# Patient Record
Sex: FEMALE | Race: ASIAN | NOT HISPANIC OR LATINO
[De-identification: names, ages, dates, MRNs, and addresses within clinical notes are randomized per-mention and may not be internally consistent; named-entity substitution may affect disease eponyms.]

---

## 2020-01-06 PROBLEM — Z00.00 ENCOUNTER FOR PREVENTIVE HEALTH EXAMINATION: Status: ACTIVE | Noted: 2020-01-06

## 2020-01-21 ENCOUNTER — APPOINTMENT (OUTPATIENT)
Dept: PULMONOLOGY | Facility: CLINIC | Age: 25
End: 2020-01-21

## 2020-03-03 ENCOUNTER — APPOINTMENT (OUTPATIENT)
Dept: ANTEPARTUM | Facility: CLINIC | Age: 25
End: 2020-03-03
Payer: COMMERCIAL

## 2020-03-03 ENCOUNTER — ASOB RESULT (OUTPATIENT)
Age: 25
End: 2020-03-03

## 2020-03-03 PROCEDURE — 36416 COLLJ CAPILLARY BLOOD SPEC: CPT

## 2020-03-03 PROCEDURE — 76801 OB US < 14 WKS SINGLE FETUS: CPT

## 2020-03-03 PROCEDURE — 76813 OB US NUCHAL MEAS 1 GEST: CPT

## 2020-04-23 ENCOUNTER — ASOB RESULT (OUTPATIENT)
Age: 25
End: 2020-04-23

## 2020-04-23 ENCOUNTER — APPOINTMENT (OUTPATIENT)
Dept: ANTEPARTUM | Facility: CLINIC | Age: 25
End: 2020-04-23
Payer: COMMERCIAL

## 2020-04-23 PROCEDURE — 76811 OB US DETAILED SNGL FETUS: CPT

## 2020-06-21 ENCOUNTER — OUTPATIENT (OUTPATIENT)
Dept: OUTPATIENT SERVICES | Facility: HOSPITAL | Age: 25
LOS: 1 days | End: 2020-06-21
Payer: COMMERCIAL

## 2020-06-21 DIAGNOSIS — O26.899 OTHER SPECIFIED PREGNANCY RELATED CONDITIONS, UNSPECIFIED TRIMESTER: ICD-10-CM

## 2020-06-21 DIAGNOSIS — Z3A.00 WEEKS OF GESTATION OF PREGNANCY NOT SPECIFIED: ICD-10-CM

## 2020-06-21 PROCEDURE — G0463: CPT

## 2020-06-21 PROCEDURE — 59025 FETAL NON-STRESS TEST: CPT

## 2020-07-14 ENCOUNTER — APPOINTMENT (OUTPATIENT)
Dept: ANTEPARTUM | Facility: CLINIC | Age: 25
End: 2020-07-14
Payer: COMMERCIAL

## 2020-07-14 ENCOUNTER — ASOB RESULT (OUTPATIENT)
Age: 25
End: 2020-07-14

## 2020-07-14 PROCEDURE — 76816 OB US FOLLOW-UP PER FETUS: CPT

## 2020-09-08 ENCOUNTER — APPOINTMENT (OUTPATIENT)
Dept: ANTEPARTUM | Facility: CLINIC | Age: 25
End: 2020-09-08

## 2020-09-08 ENCOUNTER — ASOB RESULT (OUTPATIENT)
Age: 25
End: 2020-09-08

## 2020-09-08 ENCOUNTER — APPOINTMENT (OUTPATIENT)
Dept: ANTEPARTUM | Facility: CLINIC | Age: 25
End: 2020-09-08
Payer: COMMERCIAL

## 2020-09-08 PROCEDURE — 76816 OB US FOLLOW-UP PER FETUS: CPT

## 2020-09-08 PROCEDURE — 76820 UMBILICAL ARTERY ECHO: CPT

## 2020-09-08 PROCEDURE — 76818 FETAL BIOPHYS PROFILE W/NST: CPT

## 2020-09-10 ENCOUNTER — APPOINTMENT (OUTPATIENT)
Dept: ANTEPARTUM | Facility: CLINIC | Age: 25
End: 2020-09-10

## 2020-09-10 ENCOUNTER — INPATIENT (INPATIENT)
Facility: HOSPITAL | Age: 25
LOS: 2 days | Discharge: HOME | End: 2020-09-13
Attending: OBSTETRICS & GYNECOLOGY | Admitting: OBSTETRICS & GYNECOLOGY
Payer: COMMERCIAL

## 2020-09-10 VITALS — HEIGHT: 64 IN | WEIGHT: 149.91 LBS

## 2020-09-10 DIAGNOSIS — O26.899 OTHER SPECIFIED PREGNANCY RELATED CONDITIONS, UNSPECIFIED TRIMESTER: ICD-10-CM

## 2020-09-10 DIAGNOSIS — Z3A.00 WEEKS OF GESTATION OF PREGNANCY NOT SPECIFIED: ICD-10-CM

## 2020-09-10 DIAGNOSIS — Z34.80 ENCOUNTER FOR SUPERVISION OF OTHER NORMAL PREGNANCY, UNSPECIFIED TRIMESTER: ICD-10-CM

## 2020-09-10 LAB
BASOPHILS # BLD AUTO: 0.03 K/UL — SIGNIFICANT CHANGE UP (ref 0–0.2)
BASOPHILS NFR BLD AUTO: 0.3 % — SIGNIFICANT CHANGE UP (ref 0–2)
DAT POLY-SP REAG RBC QL: NEGATIVE — SIGNIFICANT CHANGE UP
EOSINOPHIL # BLD AUTO: 0.07 K/UL — SIGNIFICANT CHANGE UP (ref 0–0.5)
EOSINOPHIL NFR BLD AUTO: 0.6 % — SIGNIFICANT CHANGE UP (ref 0–6)
HCT VFR BLD CALC: 36.5 % — SIGNIFICANT CHANGE UP (ref 34.5–45)
HGB BLD-MCNC: 12.3 G/DL — SIGNIFICANT CHANGE UP (ref 11.5–15.5)
IMM GRANULOCYTES NFR BLD AUTO: 0.7 % — SIGNIFICANT CHANGE UP (ref 0–1.5)
LYMPHOCYTES # BLD AUTO: 18 % — SIGNIFICANT CHANGE UP (ref 13–44)
LYMPHOCYTES # BLD AUTO: 2.05 K/UL — SIGNIFICANT CHANGE UP (ref 1–3.3)
MCHC RBC-ENTMCNC: 31.8 PG — SIGNIFICANT CHANGE UP (ref 27–34)
MCHC RBC-ENTMCNC: 33.7 GM/DL — SIGNIFICANT CHANGE UP (ref 32–36)
MCV RBC AUTO: 94.3 FL — SIGNIFICANT CHANGE UP (ref 80–100)
MONOCYTES # BLD AUTO: 0.78 K/UL — SIGNIFICANT CHANGE UP (ref 0–0.9)
MONOCYTES NFR BLD AUTO: 6.9 % — SIGNIFICANT CHANGE UP (ref 2–14)
NEUTROPHILS # BLD AUTO: 8.35 K/UL — HIGH (ref 1.8–7.4)
NEUTROPHILS NFR BLD AUTO: 73.5 % — SIGNIFICANT CHANGE UP (ref 43–77)
NRBC # BLD: 0 /100 WBCS — SIGNIFICANT CHANGE UP (ref 0–0)
PLATELET # BLD AUTO: 180 K/UL — SIGNIFICANT CHANGE UP (ref 150–400)
RBC # BLD: 3.87 M/UL — SIGNIFICANT CHANGE UP (ref 3.8–5.2)
RBC # FLD: 12.1 % — SIGNIFICANT CHANGE UP (ref 10.3–14.5)
RH IG SCN BLD-IMP: POSITIVE — SIGNIFICANT CHANGE UP
SARS-COV-2 RNA SPEC QL NAA+PROBE: SIGNIFICANT CHANGE UP
WBC # BLD: 11.36 K/UL — HIGH (ref 3.8–10.5)
WBC # FLD AUTO: 11.36 K/UL — HIGH (ref 3.8–10.5)

## 2020-09-10 RX ORDER — OXYTOCIN 10 UNIT/ML
333.33 VIAL (ML) INJECTION
Qty: 20 | Refills: 0 | Status: DISCONTINUED | OUTPATIENT
Start: 2020-09-10 | End: 2020-09-13

## 2020-09-10 RX ORDER — SODIUM CHLORIDE 9 MG/ML
1000 INJECTION, SOLUTION INTRAVENOUS
Refills: 0 | Status: DISCONTINUED | OUTPATIENT
Start: 2020-09-10 | End: 2020-09-11

## 2020-09-10 RX ORDER — CITRIC ACID/SODIUM CITRATE 300-500 MG
15 SOLUTION, ORAL ORAL EVERY 6 HOURS
Refills: 0 | Status: DISCONTINUED | OUTPATIENT
Start: 2020-09-10 | End: 2020-09-11

## 2020-09-11 LAB
BLD GP AB SCN SERPL QL: NEGATIVE — SIGNIFICANT CHANGE UP
SARS-COV-2 IGG SERPL QL IA: NEGATIVE — SIGNIFICANT CHANGE UP
SARS-COV-2 IGM SERPL IA-ACNC: <0.1 INDEX — SIGNIFICANT CHANGE UP
T PALLIDUM AB TITR SER: NEGATIVE — SIGNIFICANT CHANGE UP

## 2020-09-11 RX ORDER — SODIUM CHLORIDE 9 MG/ML
1000 INJECTION, SOLUTION INTRAVENOUS
Refills: 0 | Status: DISCONTINUED | OUTPATIENT
Start: 2020-09-11 | End: 2020-09-13

## 2020-09-11 RX ORDER — MAGNESIUM HYDROXIDE 400 MG/1
30 TABLET, CHEWABLE ORAL
Refills: 0 | Status: DISCONTINUED | OUTPATIENT
Start: 2020-09-11 | End: 2020-09-13

## 2020-09-11 RX ORDER — DIPHENHYDRAMINE HCL 50 MG
25 CAPSULE ORAL EVERY 6 HOURS
Refills: 0 | Status: DISCONTINUED | OUTPATIENT
Start: 2020-09-11 | End: 2020-09-13

## 2020-09-11 RX ORDER — NALOXONE HYDROCHLORIDE 4 MG/.1ML
0.1 SPRAY NASAL
Refills: 0 | Status: DISCONTINUED | OUTPATIENT
Start: 2020-09-11 | End: 2020-09-12

## 2020-09-11 RX ORDER — LANOLIN
1 OINTMENT (GRAM) TOPICAL EVERY 6 HOURS
Refills: 0 | Status: DISCONTINUED | OUTPATIENT
Start: 2020-09-11 | End: 2020-09-13

## 2020-09-11 RX ORDER — OXYTOCIN 10 UNIT/ML
333.33 VIAL (ML) INJECTION
Qty: 20 | Refills: 0 | Status: DISCONTINUED | OUTPATIENT
Start: 2020-09-11 | End: 2020-09-13

## 2020-09-11 RX ORDER — TETANUS TOXOID, REDUCED DIPHTHERIA TOXOID AND ACELLULAR PERTUSSIS VACCINE, ADSORBED 5; 2.5; 8; 8; 2.5 [IU]/.5ML; [IU]/.5ML; UG/.5ML; UG/.5ML; UG/.5ML
0.5 SUSPENSION INTRAMUSCULAR ONCE
Refills: 0 | Status: DISCONTINUED | OUTPATIENT
Start: 2020-09-11 | End: 2020-09-13

## 2020-09-11 RX ORDER — ACETAMINOPHEN 500 MG
975 TABLET ORAL
Refills: 0 | Status: DISCONTINUED | OUTPATIENT
Start: 2020-09-11 | End: 2020-09-13

## 2020-09-11 RX ORDER — ONDANSETRON 8 MG/1
4 TABLET, FILM COATED ORAL EVERY 6 HOURS
Refills: 0 | Status: DISCONTINUED | OUTPATIENT
Start: 2020-09-11 | End: 2020-09-12

## 2020-09-11 RX ORDER — DEXAMETHASONE 0.5 MG/5ML
4 ELIXIR ORAL EVERY 6 HOURS
Refills: 0 | Status: DISCONTINUED | OUTPATIENT
Start: 2020-09-11 | End: 2020-09-12

## 2020-09-11 RX ORDER — OXYCODONE HYDROCHLORIDE 5 MG/1
5 TABLET ORAL ONCE
Refills: 0 | Status: DISCONTINUED | OUTPATIENT
Start: 2020-09-11 | End: 2020-09-13

## 2020-09-11 RX ORDER — SENNA PLUS 8.6 MG/1
1 TABLET ORAL ONCE
Refills: 0 | Status: COMPLETED | OUTPATIENT
Start: 2020-09-11 | End: 2020-09-11

## 2020-09-11 RX ORDER — OXYCODONE HYDROCHLORIDE 5 MG/1
5 TABLET ORAL
Refills: 0 | Status: DISCONTINUED | OUTPATIENT
Start: 2020-09-11 | End: 2020-09-12

## 2020-09-11 RX ORDER — KETOROLAC TROMETHAMINE 30 MG/ML
30 SYRINGE (ML) INJECTION EVERY 6 HOURS
Refills: 0 | Status: DISCONTINUED | OUTPATIENT
Start: 2020-09-11 | End: 2020-09-11

## 2020-09-11 RX ORDER — OXYCODONE HYDROCHLORIDE 5 MG/1
5 TABLET ORAL
Refills: 0 | Status: COMPLETED | OUTPATIENT
Start: 2020-09-11 | End: 2020-09-18

## 2020-09-11 RX ORDER — OXYCODONE HYDROCHLORIDE 5 MG/1
10 TABLET ORAL
Refills: 0 | Status: DISCONTINUED | OUTPATIENT
Start: 2020-09-11 | End: 2020-09-12

## 2020-09-11 RX ORDER — SIMETHICONE 80 MG/1
80 TABLET, CHEWABLE ORAL EVERY 4 HOURS
Refills: 0 | Status: DISCONTINUED | OUTPATIENT
Start: 2020-09-11 | End: 2020-09-13

## 2020-09-11 RX ORDER — MORPHINE SULFATE 50 MG/1
0.1 CAPSULE, EXTENDED RELEASE ORAL ONCE
Refills: 0 | Status: DISCONTINUED | OUTPATIENT
Start: 2020-09-11 | End: 2020-09-12

## 2020-09-11 RX ORDER — HEPARIN SODIUM 5000 [USP'U]/ML
5000 INJECTION INTRAVENOUS; SUBCUTANEOUS EVERY 12 HOURS
Refills: 0 | Status: DISCONTINUED | OUTPATIENT
Start: 2020-09-11 | End: 2020-09-13

## 2020-09-11 RX ORDER — CEFAZOLIN SODIUM 1 G
1000 VIAL (EA) INJECTION EVERY 8 HOURS
Refills: 0 | Status: DISCONTINUED | OUTPATIENT
Start: 2020-09-11 | End: 2020-09-13

## 2020-09-11 RX ORDER — IBUPROFEN 200 MG
600 TABLET ORAL EVERY 6 HOURS
Refills: 0 | Status: COMPLETED | OUTPATIENT
Start: 2020-09-11 | End: 2021-08-10

## 2020-09-11 RX ADMIN — Medication 975 MILLIGRAM(S): at 19:50

## 2020-09-11 RX ADMIN — Medication 30 MILLIGRAM(S): at 08:21

## 2020-09-11 RX ADMIN — Medication 975 MILLIGRAM(S): at 13:20

## 2020-09-11 RX ADMIN — Medication 975 MILLIGRAM(S): at 23:35

## 2020-09-11 RX ADMIN — Medication 30 MILLIGRAM(S): at 15:20

## 2020-09-11 RX ADMIN — Medication 30 MILLIGRAM(S): at 15:50

## 2020-09-11 RX ADMIN — Medication 100 MILLIGRAM(S): at 23:00

## 2020-09-11 RX ADMIN — HEPARIN SODIUM 5000 UNIT(S): 5000 INJECTION INTRAVENOUS; SUBCUTANEOUS at 19:50

## 2020-09-11 RX ADMIN — Medication 975 MILLIGRAM(S): at 23:00

## 2020-09-11 RX ADMIN — Medication 975 MILLIGRAM(S): at 04:40

## 2020-09-11 RX ADMIN — Medication 100 MILLIGRAM(S): at 15:16

## 2020-09-11 RX ADMIN — Medication 975 MILLIGRAM(S): at 13:50

## 2020-09-11 RX ADMIN — Medication 30 MILLIGRAM(S): at 21:18

## 2020-09-11 RX ADMIN — HEPARIN SODIUM 5000 UNIT(S): 5000 INJECTION INTRAVENOUS; SUBCUTANEOUS at 08:21

## 2020-09-11 RX ADMIN — Medication 30 MILLIGRAM(S): at 08:50

## 2020-09-11 RX ADMIN — Medication 100 MILLIGRAM(S): at 08:19

## 2020-09-11 RX ADMIN — Medication 1000 MILLIUNIT(S)/MIN: at 01:44

## 2020-09-11 RX ADMIN — Medication 975 MILLIGRAM(S): at 20:20

## 2020-09-11 RX ADMIN — SENNA PLUS 1 TABLET(S): 8.6 TABLET ORAL at 21:26

## 2020-09-11 NOTE — PROGRESS NOTE ADULT - SUBJECTIVE AND OBJECTIVE BOX
Day 0 of Anesthesia Pain Management Service    SUBJECTIVE:  Pain Scale Score:          [X] Refer to charted pain scores    THERAPY:    s/p    100 mcg PF morphine on 9/11/2020      MEDICATIONS  (STANDING):  acetaminophen   Tablet .. 975 milliGRAM(s) Oral <User Schedule>  ceFAZolin   IVPB 1000 milliGRAM(s) IV Intermittent every 8 hours  diphtheria/tetanus/pertussis (acellular) Vaccine (ADAcel) 0.5 milliLiter(s) IntraMuscular once  heparin   Injectable 5000 Unit(s) SubCutaneous every 12 hours  ibuprofen  Tablet. 600 milliGRAM(s) Oral every 6 hours  ketorolac   Injectable 30 milliGRAM(s) IV Push every 6 hours  lactated ringers. 1000 milliLiter(s) (125 mL/Hr) IV Continuous <Continuous>  morphine PF Spinal 0.1 milliGRAM(s) IntraThecal. once  oxytocin Infusion 333.333 milliUNIT(s)/Min (1000 mL/Hr) IV Continuous <Continuous>  oxytocin Infusion 333.333 milliUNIT(s)/Min (1000 mL/Hr) IV Continuous <Continuous>    MEDICATIONS  (PRN):  dexAMETHasone  Injectable 4 milliGRAM(s) IV Push every 6 hours PRN Nausea  diphenhydrAMINE 25 milliGRAM(s) Oral every 6 hours PRN Itching  lanolin Ointment 1 Application(s) Topical every 6 hours PRN Sore Nipples  magnesium hydroxide Suspension 30 milliLiter(s) Oral two times a day PRN Constipation  naloxone Injectable 0.1 milliGRAM(s) IV Push every 3 minutes PRN For ANY of the following changes in patient status:  A. Breaths Per Minute LESS THAN 10, B. Oxygen saturation LESS THAN 90%, C. Sedation score of 6 for Stop After: 4 Times  ondansetron Injectable 4 milliGRAM(s) IV Push every 6 hours PRN Nausea  oxyCODONE    IR 5 milliGRAM(s) Oral every 3 hours PRN Mild Pain (1 - 3)  oxyCODONE    IR 10 milliGRAM(s) Oral every 3 hours PRN Moderate Pain (4 - 6)  oxyCODONE    IR 5 milliGRAM(s) Oral every 3 hours PRN Moderate to Severe Pain (4-10)  oxyCODONE    IR 5 milliGRAM(s) Oral once PRN Moderate to Severe Pain (4-10)  simethicone 80 milliGRAM(s) Chew every 4 hours PRN Gas      OBJECTIVE:    Sedation:        	[X] Alert	 [ ] Drowsy	[ ] Arousable      [ ] Asleep       [ ] Unresponsive    Side Effects:	[X] None 	[ ] Nausea	[ ] Vomiting         [ ] Pruritus  		[ ] Weakness            [ ] Numbness	          [ ] Other:    Vital Signs Last 24 Hrs  T(C): 36.4 (11 Sep 2020 08:45), Max: 36.6 (11 Sep 2020 01:30)  T(F): 97.5 (11 Sep 2020 08:45), Max: 97.9 (11 Sep 2020 01:30)  HR: 71 (11 Sep 2020 05:26) (66 - 96)  BP: 102/67 (11 Sep 2020 05:26) (99/57 - 146/59)  BP(mean): 90 (11 Sep 2020 03:45) (73 - 96)  RR: 17 (11 Sep 2020 08:45) (17 - 20)  SpO2: 97% (11 Sep 2020 08:45) (97% - 100%)    ASSESSMENT/ PLAN  [X] Patient to be transitioned to prn analgesics tomorrow   [X] Pain management per primary service, pain service to sign off   [X]Documentation and Verification of current medications

## 2020-09-12 LAB
BASOPHILS # BLD AUTO: 0.04 K/UL — SIGNIFICANT CHANGE UP (ref 0–0.2)
BASOPHILS NFR BLD AUTO: 0.3 % — SIGNIFICANT CHANGE UP (ref 0–2)
EOSINOPHIL # BLD AUTO: 0.14 K/UL — SIGNIFICANT CHANGE UP (ref 0–0.5)
EOSINOPHIL NFR BLD AUTO: 1 % — SIGNIFICANT CHANGE UP (ref 0–6)
HCT VFR BLD CALC: 27.9 % — LOW (ref 34.5–45)
HGB BLD-MCNC: 9.2 G/DL — LOW (ref 11.5–15.5)
IMM GRANULOCYTES NFR BLD AUTO: 0.7 % — SIGNIFICANT CHANGE UP (ref 0–1.5)
LYMPHOCYTES # BLD AUTO: 17.2 % — SIGNIFICANT CHANGE UP (ref 13–44)
LYMPHOCYTES # BLD AUTO: 2.46 K/UL — SIGNIFICANT CHANGE UP (ref 1–3.3)
MCHC RBC-ENTMCNC: 32.1 PG — SIGNIFICANT CHANGE UP (ref 27–34)
MCHC RBC-ENTMCNC: 33 GM/DL — SIGNIFICANT CHANGE UP (ref 32–36)
MCV RBC AUTO: 97.2 FL — SIGNIFICANT CHANGE UP (ref 80–100)
MONOCYTES # BLD AUTO: 1.06 K/UL — HIGH (ref 0–0.9)
MONOCYTES NFR BLD AUTO: 7.4 % — SIGNIFICANT CHANGE UP (ref 2–14)
NEUTROPHILS # BLD AUTO: 10.47 K/UL — HIGH (ref 1.8–7.4)
NEUTROPHILS NFR BLD AUTO: 73.4 % — SIGNIFICANT CHANGE UP (ref 43–77)
NRBC # BLD: 0 /100 WBCS — SIGNIFICANT CHANGE UP (ref 0–0)
PLATELET # BLD AUTO: 128 K/UL — LOW (ref 150–400)
RBC # BLD: 2.87 M/UL — LOW (ref 3.8–5.2)
RBC # FLD: 12.6 % — SIGNIFICANT CHANGE UP (ref 10.3–14.5)
WBC # BLD: 14.27 K/UL — HIGH (ref 3.8–10.5)
WBC # FLD AUTO: 14.27 K/UL — HIGH (ref 3.8–10.5)

## 2020-09-12 RX ORDER — ASCORBIC ACID 60 MG
500 TABLET,CHEWABLE ORAL THREE TIMES A DAY
Refills: 0 | Status: DISCONTINUED | OUTPATIENT
Start: 2020-09-12 | End: 2020-09-13

## 2020-09-12 RX ORDER — OXYCODONE HYDROCHLORIDE 5 MG/1
5 TABLET ORAL
Refills: 0 | Status: DISCONTINUED | OUTPATIENT
Start: 2020-09-12 | End: 2020-09-13

## 2020-09-12 RX ORDER — FERROUS SULFATE 325(65) MG
325 TABLET ORAL THREE TIMES A DAY
Refills: 0 | Status: DISCONTINUED | OUTPATIENT
Start: 2020-09-12 | End: 2020-09-13

## 2020-09-12 RX ORDER — SENNA PLUS 8.6 MG/1
1 TABLET ORAL ONCE
Refills: 0 | Status: COMPLETED | OUTPATIENT
Start: 2020-09-12 | End: 2020-09-12

## 2020-09-12 RX ORDER — IBUPROFEN 200 MG
600 TABLET ORAL EVERY 6 HOURS
Refills: 0 | Status: DISCONTINUED | OUTPATIENT
Start: 2020-09-12 | End: 2020-09-13

## 2020-09-12 RX ORDER — OXYCODONE HYDROCHLORIDE 5 MG/1
5 TABLET ORAL ONCE
Refills: 0 | Status: DISCONTINUED | OUTPATIENT
Start: 2020-09-12 | End: 2020-09-12

## 2020-09-12 RX ADMIN — Medication 325 MILLIGRAM(S): at 17:57

## 2020-09-12 RX ADMIN — SIMETHICONE 80 MILLIGRAM(S): 80 TABLET, CHEWABLE ORAL at 17:58

## 2020-09-12 RX ADMIN — Medication 100 MILLIGRAM(S): at 17:58

## 2020-09-12 RX ADMIN — MAGNESIUM HYDROXIDE 30 MILLILITER(S): 400 TABLET, CHEWABLE ORAL at 20:25

## 2020-09-12 RX ADMIN — Medication 600 MILLIGRAM(S): at 13:15

## 2020-09-12 RX ADMIN — Medication 100 MILLIGRAM(S): at 09:46

## 2020-09-12 RX ADMIN — SENNA PLUS 1 TABLET(S): 8.6 TABLET ORAL at 14:30

## 2020-09-12 RX ADMIN — Medication 600 MILLIGRAM(S): at 21:00

## 2020-09-12 RX ADMIN — Medication 975 MILLIGRAM(S): at 23:25

## 2020-09-12 RX ADMIN — Medication 975 MILLIGRAM(S): at 22:51

## 2020-09-12 RX ADMIN — HEPARIN SODIUM 5000 UNIT(S): 5000 INJECTION INTRAVENOUS; SUBCUTANEOUS at 08:00

## 2020-09-12 RX ADMIN — OXYCODONE HYDROCHLORIDE 5 MILLIGRAM(S): 5 TABLET ORAL at 21:00

## 2020-09-12 RX ADMIN — Medication 600 MILLIGRAM(S): at 12:43

## 2020-09-12 RX ADMIN — OXYCODONE HYDROCHLORIDE 5 MILLIGRAM(S): 5 TABLET ORAL at 06:35

## 2020-09-12 RX ADMIN — OXYCODONE HYDROCHLORIDE 5 MILLIGRAM(S): 5 TABLET ORAL at 11:22

## 2020-09-12 RX ADMIN — Medication 975 MILLIGRAM(S): at 18:30

## 2020-09-12 RX ADMIN — OXYCODONE HYDROCHLORIDE 5 MILLIGRAM(S): 5 TABLET ORAL at 06:00

## 2020-09-12 RX ADMIN — Medication 975 MILLIGRAM(S): at 17:57

## 2020-09-12 RX ADMIN — OXYCODONE HYDROCHLORIDE 5 MILLIGRAM(S): 5 TABLET ORAL at 20:25

## 2020-09-12 RX ADMIN — Medication 975 MILLIGRAM(S): at 09:00

## 2020-09-12 RX ADMIN — HEPARIN SODIUM 5000 UNIT(S): 5000 INJECTION INTRAVENOUS; SUBCUTANEOUS at 20:25

## 2020-09-12 RX ADMIN — Medication 975 MILLIGRAM(S): at 09:30

## 2020-09-12 RX ADMIN — Medication 500 MILLIGRAM(S): at 17:57

## 2020-09-12 RX ADMIN — Medication 600 MILLIGRAM(S): at 20:25

## 2020-09-12 RX ADMIN — OXYCODONE HYDROCHLORIDE 5 MILLIGRAM(S): 5 TABLET ORAL at 10:52

## 2020-09-12 NOTE — PROGRESS NOTE ADULT - SUBJECTIVE AND OBJECTIVE BOX
Postpartum Note-  Section POD#1    Allergies  No Known Allergies    Intolerances  Denies    Rubella: Immune                      RPR: Nonreactive                    Blood Type: A+    S:Patient is a  24y   POD#1 S/P  primary C/Sec  Patient w/o complaints, pain is controlled.  Pt is OOB, tolerating PO, passing flatus. Lochia WNL.   Feeding: Breast feeding    O:  Vital Signs Last 24 Hrs  T(C): 36.6 (12 Sep 2020 10:14), Max: 37 (11 Sep 2020 17:30)  T(F): 97.9 (12 Sep 2020 10:14), Max: 98.6 (11 Sep 2020 17:30)  HR: 80 (12 Sep 2020 10:14) (74 - 98)  BP: 95/63 (12 Sep 2020 10:14) (90/56 - 95/66)  BP(mean): --  RR: 18 (12 Sep 2020 10:14) (17 - 18)  SpO2: 99% (12 Sep 2020 10:14) (98% - 99%)  I&O's Summary    11 Sep 2020 07:01  -  12 Sep 2020 07:00  --------------------------------------------------------  IN: 0 mL / OUT: 2100 mL / NET: -2100 mL        Gen: NAD  CV: rrr s1s2, CTABL  Abdomen: Soft, nontender, non-distended, fundus firm.  Bowel sounds x 4 quadrants  Incision: Clean, dry, and intact.  Negative erythema/edema/ecchymosis   Sub Q  Lochia WNL  Ext: PAS in place. Negative Homans B/L.  Pedal pulses palpated B/L    LABS:                          9.2    14.27 )-----------( 128      ( 12 Sep 2020 06:42 )             27.9       A/P:  24y  POD # 1 S/P primary   section, doing well    PMHx: Denies  Current Issues: Denies    Increase OOB  Renew IVF  Renew PCEA  DVT ppx  Regular diet  AM CBC  Routine Postpartum/Post-op care    Keiko Mckeon PA-C

## 2020-09-12 NOTE — CHART NOTE - NSCHARTNOTEFT_GEN_A_CORE
DONNIE ESPOSITO     POD#1    Vital Signs Last 24 Hrs  T(C): 36.7 (12 Sep 2020 13:46), Max: 37 (11 Sep 2020 17:30)  T(F): 98.1 (12 Sep 2020 13:46), Max: 98.6 (11 Sep 2020 17:30)  HR: 93 (12 Sep 2020 13:46) (80 - 98)  BP: 109/74 (12 Sep 2020 13:46) (90/56 - 109/74)  BP(mean): --  RR: 18 (12 Sep 2020 13:46) (17 - 18)  SpO2: 99% (12 Sep 2020 10:14) (98% - 99%)                          9.2    14.27 )-----------( 128      ( 12 Sep 2020 06:42 )             27.9     9.2/27.9    Anemia 2'2 acute blood loss at time of c/s. Not requiring blood transfusion at this time.     Plan:  - Ferrous Sulfate, Colace, Vitamin C supplementation.  - Monitor for signs/symptoms of anemia.     Keiko Mckeon PA-C

## 2020-09-13 ENCOUNTER — TRANSCRIPTION ENCOUNTER (OUTPATIENT)
Age: 25
End: 2020-09-13

## 2020-09-13 VITALS
OXYGEN SATURATION: 98 % | HEART RATE: 94 BPM | DIASTOLIC BLOOD PRESSURE: 68 MMHG | TEMPERATURE: 98 F | RESPIRATION RATE: 18 BRPM | SYSTOLIC BLOOD PRESSURE: 102 MMHG

## 2020-09-13 PROCEDURE — 86900 BLOOD TYPING SEROLOGIC ABO: CPT

## 2020-09-13 PROCEDURE — 86880 COOMBS TEST DIRECT: CPT

## 2020-09-13 PROCEDURE — 86850 RBC ANTIBODY SCREEN: CPT

## 2020-09-13 PROCEDURE — 86769 SARS-COV-2 COVID-19 ANTIBODY: CPT

## 2020-09-13 PROCEDURE — G0463: CPT

## 2020-09-13 PROCEDURE — 85025 COMPLETE CBC W/AUTO DIFF WBC: CPT

## 2020-09-13 PROCEDURE — 86780 TREPONEMA PALLIDUM: CPT

## 2020-09-13 PROCEDURE — 86901 BLOOD TYPING SEROLOGIC RH(D): CPT

## 2020-09-13 PROCEDURE — 59050 FETAL MONITOR W/REPORT: CPT

## 2020-09-13 PROCEDURE — 59025 FETAL NON-STRESS TEST: CPT

## 2020-09-13 RX ORDER — ACETAMINOPHEN 500 MG
3 TABLET ORAL
Qty: 0 | Refills: 0 | DISCHARGE
Start: 2020-09-13

## 2020-09-13 RX ORDER — IBUPROFEN 200 MG
1 TABLET ORAL
Qty: 0 | Refills: 0 | DISCHARGE
Start: 2020-09-13

## 2020-09-13 RX ADMIN — Medication 325 MILLIGRAM(S): at 06:50

## 2020-09-13 RX ADMIN — Medication 975 MILLIGRAM(S): at 15:45

## 2020-09-13 RX ADMIN — SIMETHICONE 80 MILLIGRAM(S): 80 TABLET, CHEWABLE ORAL at 06:54

## 2020-09-13 RX ADMIN — SIMETHICONE 80 MILLIGRAM(S): 80 TABLET, CHEWABLE ORAL at 02:23

## 2020-09-13 RX ADMIN — HEPARIN SODIUM 5000 UNIT(S): 5000 INJECTION INTRAVENOUS; SUBCUTANEOUS at 09:39

## 2020-09-13 RX ADMIN — Medication 600 MILLIGRAM(S): at 02:45

## 2020-09-13 RX ADMIN — Medication 975 MILLIGRAM(S): at 15:16

## 2020-09-13 RX ADMIN — Medication 975 MILLIGRAM(S): at 06:51

## 2020-09-13 RX ADMIN — Medication 600 MILLIGRAM(S): at 02:11

## 2020-09-13 RX ADMIN — Medication 975 MILLIGRAM(S): at 07:20

## 2020-09-13 RX ADMIN — Medication 100 MILLIGRAM(S): at 02:11

## 2020-09-13 RX ADMIN — Medication 500 MILLIGRAM(S): at 15:16

## 2020-09-13 RX ADMIN — Medication 325 MILLIGRAM(S): at 15:16

## 2020-09-13 RX ADMIN — Medication 600 MILLIGRAM(S): at 09:37

## 2020-09-13 RX ADMIN — Medication 600 MILLIGRAM(S): at 10:10

## 2020-09-13 RX ADMIN — MAGNESIUM HYDROXIDE 30 MILLILITER(S): 400 TABLET, CHEWABLE ORAL at 02:23

## 2020-09-13 RX ADMIN — Medication 500 MILLIGRAM(S): at 06:50

## 2020-09-13 NOTE — DISCHARGE NOTE OB - PLAN OF CARE
return to baseline Instructions:  Make your follow-up appointment with your doctor as ordered.   No heavy lifting, driving, or strenuous activity for 6 weeks.   Nothing per vagina such as tampons, intercourse, douches or tub baths for 6 weeks or until you see your doctor.   Call your doctor with any signs and symptoms of infection such as fever, chills, nausea or vomiting. Call your doctor with redness or swelling at the incision site, fluid leakage or wound separation. Call your doctor if you're unable to tolerate food, increase in vaginal bleeding, or have difficulty urinating. Call your doctor if you have pain that is not relieved by your prescribed medications. Notify your doctor with any of concerns.    HTN/PEC Pt?  Given a prescription for a blood pressure cuff to monitor your blood pressure at home. Call your doctor if your blood pressure is greater than or equal to 160 systolic (top number) or 110 diastolic (bottom number), or you experience a headache unrelieved by OTC medications, blurred vision, or difficulty breathing.

## 2020-09-13 NOTE — DISCHARGE NOTE OB - CARE PROVIDER_API CALL
Lora Perera  OBSTETRICS AND GYNECOLOGY  [address], [address]  [place] [address]  Phone: [phone]  Fax: [phone]  Follow Up Time:

## 2020-09-13 NOTE — DISCHARGE NOTE OB - PATIENT PORTAL LINK FT
You can access the FollowMyHealth Patient Portal offered by [facility] by registering at the following website: [email or web address]. By joining [facility]’s FollowMyHealth portal, you will also be able to view your health information using other applications (apps) compatible with our system.

## 2020-09-13 NOTE — PROGRESS NOTE ADULT - SUBJECTIVE AND OBJECTIVE BOX
Postpartum Note-  Section POD#2    Allergies    No Known Allergies    Intolerances            S:Patient is a  24y G [id number]   POD#2 S/P C/Sec  Subjective: Patient w/o complaints, pain is controlled.  Pt is OOB, tolerating PO, passing flatus, and voiding. Lochia WNL.     O:  Vital Signs Last 24 Hrs  T(C): 36.4 (13 Sep 2020 07:03), Max: 36.8 (13 Sep 2020 02:02)  T(F): 97.5 (13 Sep 2020 07:03), Max: 98.2 (13 Sep 2020 02:02)  HR: 76 (13 Sep 2020 07:03) (75 - 93)  BP: 103/68 (13 Sep 2020 07:03) (95/63 - 109/74)  BP(mean): --  RR: 18 (13 Sep 2020 07:03) (18 - 18)  SpO2: 98% (13 Sep 2020 07:03) (95% - 100%)      Gen: NAD  Abdomen: Soft, nontender, non distened, fundus firm.  Incision: Clean, dry, and intact.  Negative erythema/edema/ecchymosis.   SubQ-dermabond  Lochia WNL  Ext: Neg edema, Neg calf tenderness    LABS:                          9.2    14.27 )-----------( 128      ( 12 Sep 2020 06:42 )             27.9

## 2020-09-13 NOTE — DISCHARGE NOTE OB - MEDICATION SUMMARY - MEDICATIONS TO TAKE
I will START or STAY ON the medications listed below when I get home from the hospital:    acetaminophen 325 mg oral tablet  -- 3 tab(s) by mouth   -- Indication: For pain control    ibuprofen 600 mg oral tablet  -- 1 tab(s) by mouth every 6 hours  -- Indication: For pain control

## 2020-09-13 NOTE — DISCHARGE NOTE OB - HOSPITAL COURSE
Patient underwent an uncomplicated primary LTCS EBL. H/H as below. Patient’s postoperative course was unremarkable and she remained hemodynamically stable and afebrile throughout. Upon discharge on POD3, the patient is ambulating and voiding spontaneously, tolerating oral intake, pain was well controlled with oral medication, and vital signs were stable.               9.2    14.27 )-----------( 128      ( 09-12 @ 06:42 )             27.9                12.3   11.36 )-----------( 180      ( 09-10 @ 19:52 )             36.5

## 2020-09-13 NOTE — DISCHARGE NOTE OB - CARE PLAN
Principal Discharge DX:	 delivery delivered  Goal:	return to baseline  Assessment and plan of treatment:	Instructions:  Make your follow-up appointment with your doctor as ordered.   No heavy lifting, driving, or strenuous activity for 6 weeks.   Nothing per vagina such as tampons, intercourse, douches or tub baths for 6 weeks or until you see your doctor.   Call your doctor with any signs and symptoms of infection such as fever, chills, nausea or vomiting. Call your doctor with redness or swelling at the incision site, fluid leakage or wound separation. Call your doctor if you're unable to tolerate food, increase in vaginal bleeding, or have difficulty urinating. Call your doctor if you have pain that is not relieved by your prescribed medications. Notify your doctor with any of concerns.    HTN/PEC Pt?  Given a prescription for a blood pressure cuff to monitor your blood pressure at home. Call your doctor if your blood pressure is greater than or equal to 160 systolic (top number) or 110 diastolic (bottom number), or you experience a headache unrelieved by OTC medications, blurred vision, or difficulty breathing.

## 2021-08-09 ENCOUNTER — RESULT REVIEW (OUTPATIENT)
Age: 26
End: 2021-08-09

## 2021-09-13 ENCOUNTER — ASOB RESULT (OUTPATIENT)
Age: 26
End: 2021-09-13

## 2021-09-13 ENCOUNTER — APPOINTMENT (OUTPATIENT)
Dept: ANTEPARTUM | Facility: CLINIC | Age: 26
End: 2021-09-13
Payer: COMMERCIAL

## 2021-09-13 PROCEDURE — 76813 OB US NUCHAL MEAS 1 GEST: CPT | Mod: 59

## 2021-09-13 PROCEDURE — 36416 COLLJ CAPILLARY BLOOD SPEC: CPT

## 2021-09-13 PROCEDURE — 76801 OB US < 14 WKS SINGLE FETUS: CPT

## 2021-10-23 ENCOUNTER — OUTPATIENT (OUTPATIENT)
Dept: OUTPATIENT SERVICES | Facility: HOSPITAL | Age: 26
LOS: 1 days | End: 2021-10-23
Payer: COMMERCIAL

## 2021-10-23 VITALS
TEMPERATURE: 98 F | HEART RATE: 91 BPM | SYSTOLIC BLOOD PRESSURE: 102 MMHG | DIASTOLIC BLOOD PRESSURE: 66 MMHG | RESPIRATION RATE: 17 BRPM

## 2021-10-23 VITALS — TEMPERATURE: 98 F | SYSTOLIC BLOOD PRESSURE: 99 MMHG | HEART RATE: 88 BPM | DIASTOLIC BLOOD PRESSURE: 61 MMHG

## 2021-10-23 DIAGNOSIS — O26.899 OTHER SPECIFIED PREGNANCY RELATED CONDITIONS, UNSPECIFIED TRIMESTER: ICD-10-CM

## 2021-10-23 DIAGNOSIS — Z3A.00 WEEKS OF GESTATION OF PREGNANCY NOT SPECIFIED: ICD-10-CM

## 2021-10-23 PROCEDURE — G0463: CPT

## 2021-10-23 NOTE — OB PROVIDER TRIAGE NOTE - HISTORY OF PRESENT ILLNESS
26yo  @19w4d p/w nausea this AM with epigastric abdominal pain. –VB, -LOF, -Ctx, +FM. denies fever, chills, vomiting, diarrhea, headache, dizziness, syncope, chest pain, palpitations, shortness of breath, dysuria, urgency, frequency.  PNC: [facility]  ObHx: CSx1 in 2020 for CAT2  GynHx: denies  MedHx: constipation  SrgHx: denies  PsychHx: denies  SocialHx: denies  AllergyHx: denies  RxHx: Colace

## 2021-10-23 NOTE — OB PROVIDER TRIAGE NOTE - NSOBPROVIDERNOTE_OBGYN_ALL_OB_FT
26yo  @19w4d p/w nausea this AM with epigastric abdominal pain. Currently feels well with appetite. Mild abdominal pain but low c/f acute appendicitis or choleycystitis  - Pt has Anatomy sono Monday  - Will followup outpatient    dw Dr.Jacob Juan Hilliard PGY3

## 2021-10-23 NOTE — OB PROVIDER TRIAGE NOTE - NSHPPHYSICALEXAM_GEN_ALL_CORE
ICU Vital Signs Last 24 Hrs  T(C): 36.6 (23 Oct 2021 18:37), Max: 36.7 (23 Oct 2021 17:42)  T(F): 97.9 (23 Oct 2021 18:37), Max: 98.1 (23 Oct 2021 17:42)  HR: 75 (23 Oct 2021 18:37) (75 - 98)  BP: 99/61 (23 Oct 2021 18:37) (97/66 - 99/61)  RR: 17 (23 Oct 2021 18:37) (17 - 19)  SpO2: 100% (23 Oct 2021 17:42) (100% - 100%)    CV:RRR  Pulm: CTA bl  VE: deferred  BSUS: , grossly normal fetus and fluid  TOCO: none

## 2021-10-25 ENCOUNTER — ASOB RESULT (OUTPATIENT)
Age: 26
End: 2021-10-25

## 2021-10-25 ENCOUNTER — APPOINTMENT (OUTPATIENT)
Dept: ANTEPARTUM | Facility: CLINIC | Age: 26
End: 2021-10-25
Payer: COMMERCIAL

## 2021-10-25 PROCEDURE — 76811 OB US DETAILED SNGL FETUS: CPT

## 2022-02-02 ENCOUNTER — APPOINTMENT (OUTPATIENT)
Dept: ANTEPARTUM | Facility: CLINIC | Age: 27
End: 2022-02-02
Payer: COMMERCIAL

## 2022-02-02 ENCOUNTER — ASOB RESULT (OUTPATIENT)
Age: 27
End: 2022-02-02

## 2022-02-02 PROCEDURE — 76820 UMBILICAL ARTERY ECHO: CPT

## 2022-02-02 PROCEDURE — 76816 OB US FOLLOW-UP PER FETUS: CPT

## 2022-02-02 PROCEDURE — 76818 FETAL BIOPHYS PROFILE W/NST: CPT

## 2022-02-16 ENCOUNTER — OUTPATIENT (OUTPATIENT)
Dept: OUTPATIENT SERVICES | Facility: HOSPITAL | Age: 27
LOS: 1 days | End: 2022-02-16
Payer: COMMERCIAL

## 2022-02-16 VITALS
OXYGEN SATURATION: 99 % | DIASTOLIC BLOOD PRESSURE: 74 MMHG | TEMPERATURE: 99 F | WEIGHT: 136.03 LBS | HEIGHT: 64 IN | HEART RATE: 82 BPM | RESPIRATION RATE: 16 BRPM | SYSTOLIC BLOOD PRESSURE: 107 MMHG

## 2022-02-16 DIAGNOSIS — Z01.818 ENCOUNTER FOR OTHER PREPROCEDURAL EXAMINATION: ICD-10-CM

## 2022-02-16 DIAGNOSIS — O34.211 MATERNAL CARE FOR LOW TRANSVERSE SCAR FROM PREVIOUS CESAREAN DELIVERY: ICD-10-CM

## 2022-02-16 DIAGNOSIS — Z98.891 HISTORY OF UTERINE SCAR FROM PREVIOUS SURGERY: Chronic | ICD-10-CM

## 2022-02-16 DIAGNOSIS — Z98.890 OTHER SPECIFIED POSTPROCEDURAL STATES: Chronic | ICD-10-CM

## 2022-02-16 DIAGNOSIS — Z98.891 HISTORY OF UTERINE SCAR FROM PREVIOUS SURGERY: ICD-10-CM

## 2022-02-16 LAB
ANION GAP SERPL CALC-SCNC: 12 MMOL/L — SIGNIFICANT CHANGE UP (ref 5–17)
BLD GP AB SCN SERPL QL: NEGATIVE — SIGNIFICANT CHANGE UP
BUN SERPL-MCNC: 9 MG/DL — SIGNIFICANT CHANGE UP (ref 7–23)
CALCIUM SERPL-MCNC: 8.8 MG/DL — SIGNIFICANT CHANGE UP (ref 8.4–10.5)
CHLORIDE SERPL-SCNC: 103 MMOL/L — SIGNIFICANT CHANGE UP (ref 96–108)
CO2 SERPL-SCNC: 19 MMOL/L — LOW (ref 22–31)
CREAT SERPL-MCNC: 0.42 MG/DL — LOW (ref 0.5–1.3)
GLUCOSE SERPL-MCNC: 91 MG/DL — SIGNIFICANT CHANGE UP (ref 70–99)
HCT VFR BLD CALC: 30.9 % — LOW (ref 34.5–45)
HGB BLD-MCNC: 10.6 G/DL — LOW (ref 11.5–15.5)
MCHC RBC-ENTMCNC: 32.1 PG — SIGNIFICANT CHANGE UP (ref 27–34)
MCHC RBC-ENTMCNC: 34.3 GM/DL — SIGNIFICANT CHANGE UP (ref 32–36)
MCV RBC AUTO: 93.6 FL — SIGNIFICANT CHANGE UP (ref 80–100)
NRBC # BLD: 0 /100 WBCS — SIGNIFICANT CHANGE UP (ref 0–0)
PLATELET # BLD AUTO: 198 K/UL — SIGNIFICANT CHANGE UP (ref 150–400)
POTASSIUM SERPL-MCNC: 3.5 MMOL/L — SIGNIFICANT CHANGE UP (ref 3.5–5.3)
POTASSIUM SERPL-SCNC: 3.5 MMOL/L — SIGNIFICANT CHANGE UP (ref 3.5–5.3)
RBC # BLD: 3.3 M/UL — LOW (ref 3.8–5.2)
RBC # FLD: 12.7 % — SIGNIFICANT CHANGE UP (ref 10.3–14.5)
RH IG SCN BLD-IMP: POSITIVE — SIGNIFICANT CHANGE UP
SODIUM SERPL-SCNC: 134 MMOL/L — LOW (ref 135–145)
WBC # BLD: 9.74 K/UL — SIGNIFICANT CHANGE UP (ref 3.8–10.5)
WBC # FLD AUTO: 9.74 K/UL — SIGNIFICANT CHANGE UP (ref 3.8–10.5)

## 2022-02-16 PROCEDURE — 86900 BLOOD TYPING SEROLOGIC ABO: CPT

## 2022-02-16 PROCEDURE — 36415 COLL VENOUS BLD VENIPUNCTURE: CPT

## 2022-02-16 PROCEDURE — 85027 COMPLETE CBC AUTOMATED: CPT

## 2022-02-16 PROCEDURE — 86901 BLOOD TYPING SEROLOGIC RH(D): CPT

## 2022-02-16 PROCEDURE — G0463: CPT

## 2022-02-16 PROCEDURE — 86850 RBC ANTIBODY SCREEN: CPT

## 2022-02-16 PROCEDURE — 80048 BASIC METABOLIC PNL TOTAL CA: CPT

## 2022-02-16 RX ORDER — OXYTOCIN 10 UNIT/ML
333.33 VIAL (ML) INJECTION
Qty: 20 | Refills: 0 | Status: COMPLETED | OUTPATIENT
Start: 2022-03-08 | End: 2022-03-08

## 2022-02-16 RX ORDER — SODIUM CHLORIDE 9 MG/ML
1000 INJECTION, SOLUTION INTRAVENOUS
Refills: 0 | Status: DISCONTINUED | OUTPATIENT
Start: 2022-03-08 | End: 2022-03-08

## 2022-02-16 RX ORDER — CEFAZOLIN SODIUM 1 G
2000 VIAL (EA) INJECTION ONCE
Refills: 0 | Status: DISCONTINUED | OUTPATIENT
Start: 2022-03-08 | End: 2022-03-10

## 2022-02-16 NOTE — OB PST NOTE - NSHPPHYSICALEXAM_GEN_ALL_CORE
PHYSICAL EXAM:      Constitutional: Moderately built female    Eyes: PERRLA    ENMT: WNL    Neck: supple, No JVD    Breasts: Not examined    Back: WNL    Respiratory: CTA bilaterally    Cardiovascular: S1S2 reg    Gastrointestinal: soft +BS    Genitourinary: Pt refused    Rectal: Refused    Extremities: No edema, +PP    Vascular: WNL    Neurological: Alert O x3    Skin: W&D    Lymph Nodes: none palpable    Musculoskeletal: WNL    Psychiatric: affect normal

## 2022-02-16 NOTE — OB PST NOTE - NSICDXFAMILYHX_GEN_ALL_CORE_FT
FAMILY HISTORY:  Father  Still living? Yes, Estimated age: 61-70  Family history of early CAD, Age at diagnosis: Age Unknown  FH: HTN (hypertension), Age at diagnosis: Age Unknown  FH: type 2 diabetes, Age at diagnosis: Age Unknown    Mother  Still living? Yes, Estimated age: 61-70  FH: HTN (hypertension), Age at diagnosis: Age Unknown

## 2022-02-16 NOTE — OB PST NOTE - FALL HARM RISK - UNIVERSAL INTERVENTIONS
Bed in lowest position, wheels locked, appropriate side rails in place/Call bell, personal items and telephone in reach/Instruct patient to call for assistance before getting out of bed or chair/Non-slip footwear when patient is out of bed/[place] to call system/Physically safe environment - no spills, clutter or unnecessary equipment/Purposeful Proactive Rounding/Room/bathroom lighting operational, light cord in reach

## 2022-02-22 PROBLEM — Z87.898 PERSONAL HISTORY OF OTHER SPECIFIED CONDITIONS: Chronic | Status: ACTIVE | Noted: 2022-02-16

## 2022-02-23 ENCOUNTER — ASOB RESULT (OUTPATIENT)
Age: 27
End: 2022-02-23

## 2022-02-23 ENCOUNTER — APPOINTMENT (OUTPATIENT)
Dept: ANTEPARTUM | Facility: CLINIC | Age: 27
End: 2022-02-23

## 2022-02-23 ENCOUNTER — APPOINTMENT (OUTPATIENT)
Dept: ANTEPARTUM | Facility: CLINIC | Age: 27
End: 2022-02-23
Payer: COMMERCIAL

## 2022-02-23 PROCEDURE — 76820 UMBILICAL ARTERY ECHO: CPT

## 2022-02-23 PROCEDURE — 99204 OFFICE O/P NEW MOD 45 MIN: CPT | Mod: 25

## 2022-02-23 PROCEDURE — 76816 OB US FOLLOW-UP PER FETUS: CPT

## 2022-02-23 PROCEDURE — 76818 FETAL BIOPHYS PROFILE W/NST: CPT

## 2022-02-28 ENCOUNTER — ASOB RESULT (OUTPATIENT)
Age: 27
End: 2022-02-28

## 2022-02-28 ENCOUNTER — APPOINTMENT (OUTPATIENT)
Dept: ANTEPARTUM | Facility: CLINIC | Age: 27
End: 2022-02-28
Payer: COMMERCIAL

## 2022-02-28 ENCOUNTER — OUTPATIENT (OUTPATIENT)
Dept: OUTPATIENT SERVICES | Facility: HOSPITAL | Age: 27
LOS: 1 days | End: 2022-02-28
Payer: COMMERCIAL

## 2022-02-28 VITALS — HEART RATE: 78 BPM | DIASTOLIC BLOOD PRESSURE: 56 MMHG | SYSTOLIC BLOOD PRESSURE: 107 MMHG | TEMPERATURE: 98 F

## 2022-02-28 VITALS — HEART RATE: 86 BPM | OXYGEN SATURATION: 100 %

## 2022-02-28 DIAGNOSIS — Z98.891 HISTORY OF UTERINE SCAR FROM PREVIOUS SURGERY: Chronic | ICD-10-CM

## 2022-02-28 DIAGNOSIS — Z98.890 OTHER SPECIFIED POSTPROCEDURAL STATES: Chronic | ICD-10-CM

## 2022-02-28 DIAGNOSIS — Z3A.00 WEEKS OF GESTATION OF PREGNANCY NOT SPECIFIED: ICD-10-CM

## 2022-02-28 DIAGNOSIS — O26.899 OTHER SPECIFIED PREGNANCY RELATED CONDITIONS, UNSPECIFIED TRIMESTER: ICD-10-CM

## 2022-02-28 PROCEDURE — 76818 FETAL BIOPHYS PROFILE W/NST: CPT

## 2022-02-28 PROCEDURE — G0463: CPT

## 2022-02-28 PROCEDURE — 99213 OFFICE O/P EST LOW 20 MIN: CPT | Mod: 1L

## 2022-02-28 PROCEDURE — 59025 FETAL NON-STRESS TEST: CPT

## 2022-02-28 PROCEDURE — 76820 UMBILICAL ARTERY ECHO: CPT

## 2022-02-28 NOTE — OB PROVIDER TRIAGE NOTE - PATIENT'S GENDER IDENTITY
Visit Information Doris Florence Personal Médico Departamento Teléfono del Dep. Número de visita 11/9/2017  3:30 PM Delmi Patel, [address] [facility] [phone] Follow-up Instructions Return if symptoms worsen or fail to improve. Upcoming Health Maintenance Date Due Pneumococcal 19-64 Medium Risk (1 of 1 - PPSV23) 3/5/1988 DTaP/Tdap/Td series (1 - Tdap) 3/5/1990 PAP AKA CERVICAL CYTOLOGY 3/5/1990 Influenza Age 5 to Adult 8/1/2017 Alergias  Review Complete El: 11/9/2017 Por: Erik Dryer, FNP A partir del:  11/9/2017 Intensidad Anotado Tipo de reacción [employer]  10/31/2013    Swelling Mouth. Oxycodone-acetaminophen Medium 06/24/2016   Intolerance Other (comments) Feeling of numbness Vacunas actuales Monika Dakins No hay ninguna vacuna archivada. No revisadas esta visita You Were Diagnosed With   
  
 Castro Mccloud Paronychia of finger, unspecified laterality    -  Primary ICD-10-CM: Z78.554 
ICD-9-CM: 681.02 Partes vitales PS Pulso Temperatura Resp [employer] ( percentil de crecimiento) Peso (percentil de crecimiento) 113/71 (BP 1 Location: Left arm, BP Patient Position: Sitting) 79 98.2 °F (36.8 °C) (Oral) 18 5' 3\" (1.6 m) 146 lb 12.8 oz (66.6 kg) LMP (última suhail) SpO2 BMI (IMC) Estado obstétrico Estatus de tabaquísmo 11/02/2017 (Exact Date) 98% 26 kg/m2 Having regular periods Current Every Day Smoker Historial de signos vitales BMI and BSA Data Body Mass Index Body Surface Area  
 26 kg/m 2 1.72 m 2 Miguelito Lozano Pharmacy Name Phone [phone] [id number], [phone] [phone] Cristobal lista de medicamentos actualizada Lista actualizada el: 11/9/17  4:07 PM.  Mane Garcia use cristobal lista de medicamentos más reciente. cephALEXin 250 mg capsule También conocido leoncio:  Fabiana Hides Take 1 Cap by mouth four (4) times daily for 10 days. naproxen 500 mg tablet También conocido leoncio:  NAPROSYN Take 1 Tab by mouth two (2) times daily (with meals). omeprazole 40 mg capsule También conocido leoncio:  Griselda Buggy Take 40 mg by mouth daily. Recetas Enviado a la [place] Refills  
 cephALEXin (KEFLEX) 250 mg capsule 0 Sig: Take 1 Cap by mouth four (4) times daily for 10 days. Class: Normal  
 Pharmacy: [address], [address] [place] [address] #: [phone] Route: Oral  
  
Instrucciones de seguimiento Return if symptoms worsen or fail to improve. Instrucciones para el Paciente Paroniquia: Instrucciones de cuidado - [ Paronychia: Care Instructions ] Instrucciones de cuidado La paroniquia es indra infección de la piel alrededor de indra uña de un dedo de la mano o del pie. Sucede cuando entran microbios a través de un guero en la piel. John vez el médico haya hecho indra pequeña incisión en la cris infectada para sacar el pus. La mayoría de casos de paroniquia mejoran en pocos días. Helen vigile amanda síntomas y [id number] [employer] [place] los consejos de antoine médico. Aunque es poco común, un haroldo leve puede volverse algo más lucille e infectar todo antoine dedo. También es posible que indra infección regrese. La atención de seguimiento es indra parte clave de antoine tratamiento y seguridad. Asegúrese de hacer y acudir a todas las citas, y llame a antoine médico si está teniendo problemas. También es indra buena idea saber los resultados de los exámenes y mantener indra lista de los medicamentos que cassie. Cómo puede cuidarse en el hogar? · Si antoine médico le dijo cómo cuidarse la uña infectada, siga las instrucciones de antoine médico. Si no le tarun instrucciones, siga estos consejos generales: 
¨ Lávese la cris con agua limpia 2 veces al día. No use peróxido de hidrógeno (agua [place]) ni alcohol, los cuales pueden retrasar la sanación. ¨ Puede cubrir la cris con indra capa delgada de vaselina y indra venda no adherente. ¨ Aplíquese más vaselina y [facility] la venda según sea necesario. · Si antoine médico le recetó antibióticos, tómelos según las indicaciones. No deje de tomarlos por el hecho de sentirse mejor. Debe marleni todos los antibióticos hasta terminarlos. · Lawtey un analgésico (medicamento para el dolor) de venta ortega, leoncio acetaminofén (Tylenol), ibuprofeno (Advil, Motrin) o naproxeno (Aleve). Mady y siga todas las instrucciones de la [place]. · No tome dos o más analgésicos al mismo tiempo a menos que el médico se lo haya indicado. Muchos analgésicos contienen acetaminofén, es decir, Tylenol. El exceso de acetaminofén (Tylenol) puede ser dañino. · Eleve el dedo por encima del nivel de antoine corazón. [place] ayuda a reducir la hinchazón y el dolor. · Aplíquese calor. Colóquese indra bolsa de [place], indra almohadilla térmica ajustada a baja temperatura o un paño tibio sobre el dedo. No se vaya a dormir con indra almohadilla térmica sobre la piel. · Remoje la cris en agua tibia dos veces al día por 15 minutos cada vez. Después de remojarla, séquese louise la cris y aplíquese indra capa delgada de vaselina. Póngase indra venda nueva. Cuándo debe pedir ayuda? Llame a antoine médico ahora mismo o busque atención médica inmediata si: 
? · Tiene señales de indra infección nueva o que BIA, tales leoncio: ¨ Aumento del dolor, la hinchazón, la temperatura o el enrojecimiento. ¨ Vetas rojizas que salen de la cris de piel infectada. ¨ Pus que sale de la cris. Hunter Melendez. ?Preste especial atención a los cambios en antoine [place] y asegúrese de comunicarse con antoine médico si: 
? · No mejora leoncio se esperaba. Dónde puede encontrar más información en inglés? Lizette Carrillo a [email or web address] Escriba C435 en la búsqueda para aprender más acerca de \"Paroniquia: Instrucciones de cuidado - [ Paronychia: Care Instructions ]. \" 
Revisado: 13 octubre, 2016 Versión del contenido: 11.4 © 3084-5909 Healthwise, Incorporated. Las instrucciones de cuidado fueron adaptadas bajo licencia por [employer] (which disclaims liability or warranty for this information). Si usted tiene [place] [facility] afección médica o sobre estas instrucciones, siempre pregunte a antoine profesional de [place]. Healthwise, Incorporated niega toda garantía o responsabilidad por antoine uso de esta información. Introducing [facility] SERVICES! Estimado Danuta : 
Aminta por solicitar indra cuenta de MyChart usted. Nuestros registros indican que usted ya tiene indra cuenta [facility] [place]. Usted puede acceder a antoine cuenta en cualquier momento en [email or web address] Sabía usted que usted puede acceder a antoine hospital y las instrucciones de noemí ER en cualquier momento en MyChart ? También puede revisar [facility] de las pruebas de antoine hospitalización o visita a urgencias . Información Adicional 
 
Si tiene alguna pregunta , por favor visite la sección de preguntas frecuentes del sitio web MyChart en [email or web address] . Recuerde, MyChart NO es que se utilizará para las necesidades urgentes. Para emergencias médicas , llame al 911 . Ahora disponible en antoine iPhone y Android ! Por favor proporcione javier resumen de la documentación de cuidado a antoine próximo proveedor. Your primary care clinician is listed as Samanta Muñoz. If you have any questions after today's visit, please call [phone]. Female

## 2022-02-28 NOTE — OB PROVIDER TRIAGE NOTE - NSOBPROVIDERNOTE_OBGYN_ALL_OB_FT
A/P: 25yo  @ 37.6 weeks no in labor  - NST-->home  - Discharge home with labor precautions     dw Dr Trever Uribe PAC

## 2022-02-28 NOTE — OB PROVIDER TRIAGE NOTE - NSHPPHYSICALEXAM_GEN_ALL_CORE
PE:  T(C): 36.6 (02-28-22 @ 19:00), Max: 36.6 (02-28-22 @ 18:58)  HR: 79 (02-28-22 @ 19:15) (78 - 84)  BP: 107/56 (02-28-22 @ 19:00) (107/56 - 107/56)  RR: 18 (02-28-22 @ 19:00) (18 - 18)  SpO2: 99% (02-28-22 @ 19:15) (99% - 100%)  General: NAD, A&Ox3  CV: RRR  Lungs: Clear bilat   Abd: soft, nontender, gravid  VE: 0/70/-1  EFM: 130/moderate variablity/+accels/-decels  TOCO: acontractile

## 2022-02-28 NOTE — OB PROVIDER TRIAGE NOTE - HISTORY OF PRESENT ILLNESS
25yo  @ 37.6 weeks (THELMA 3/15) presents with rectal pressure from earlier today. Pt denies contractions but states that the rectal pressure is worse with sitting. Pt reports having a regular bowel movement this morning. +FM, -LOF, -VB    GBS negative  EFW 2700    OBHx:  FT c/s for NRFHT @ 8cm, 6#11  GYNHx: No ovarian cysts, fibroids, hx of abnormal pap or STDs  PMHx: No hx of DM, HTN, asthma, bleeding or clotting disorders or blood transfusions.  PSurgHx: c/s   Allergies: NKDA  Meds: PNV, B12, colace   Social: No smoking, alcohol, or illicit drug use during pregnancy   Psych: No hx of anxiety or depression

## 2022-03-03 ENCOUNTER — APPOINTMENT (OUTPATIENT)
Dept: ANTEPARTUM | Facility: CLINIC | Age: 27
End: 2022-03-03
Payer: COMMERCIAL

## 2022-03-03 ENCOUNTER — ASOB RESULT (OUTPATIENT)
Age: 27
End: 2022-03-03

## 2022-03-03 PROCEDURE — 76820 UMBILICAL ARTERY ECHO: CPT

## 2022-03-03 PROCEDURE — 76819 FETAL BIOPHYS PROFIL W/O NST: CPT

## 2022-03-06 ENCOUNTER — OUTPATIENT (OUTPATIENT)
Dept: OUTPATIENT SERVICES | Facility: HOSPITAL | Age: 27
LOS: 1 days | End: 2022-03-06
Payer: COMMERCIAL

## 2022-03-06 DIAGNOSIS — Z98.890 OTHER SPECIFIED POSTPROCEDURAL STATES: Chronic | ICD-10-CM

## 2022-03-06 DIAGNOSIS — Z11.52 ENCOUNTER FOR SCREENING FOR COVID-19: ICD-10-CM

## 2022-03-06 DIAGNOSIS — Z98.891 HISTORY OF UTERINE SCAR FROM PREVIOUS SURGERY: Chronic | ICD-10-CM

## 2022-03-06 LAB — SARS-COV-2 RNA SPEC QL NAA+PROBE: SIGNIFICANT CHANGE UP

## 2022-03-06 PROCEDURE — C9803: CPT

## 2022-03-06 PROCEDURE — U0005: CPT

## 2022-03-06 PROCEDURE — U0003: CPT

## 2022-03-07 ENCOUNTER — TRANSCRIPTION ENCOUNTER (OUTPATIENT)
Age: 27
End: 2022-03-07

## 2022-03-07 ENCOUNTER — APPOINTMENT (OUTPATIENT)
Dept: ANTEPARTUM | Facility: CLINIC | Age: 27
End: 2022-03-07

## 2022-03-08 ENCOUNTER — INPATIENT (INPATIENT)
Facility: HOSPITAL | Age: 27
LOS: 1 days | Discharge: HOME | End: 2022-03-10
Attending: OBSTETRICS & GYNECOLOGY | Admitting: OBSTETRICS & GYNECOLOGY
Payer: COMMERCIAL

## 2022-03-08 VITALS — SYSTOLIC BLOOD PRESSURE: 91 MMHG | DIASTOLIC BLOOD PRESSURE: 56 MMHG | HEART RATE: 76 BPM

## 2022-03-08 DIAGNOSIS — Z98.890 OTHER SPECIFIED POSTPROCEDURAL STATES: Chronic | ICD-10-CM

## 2022-03-08 DIAGNOSIS — O34.211 MATERNAL CARE FOR LOW TRANSVERSE SCAR FROM PREVIOUS CESAREAN DELIVERY: ICD-10-CM

## 2022-03-08 DIAGNOSIS — Z3A.39 39 WEEKS GESTATION OF PREGNANCY: ICD-10-CM

## 2022-03-08 DIAGNOSIS — Z98.891 HISTORY OF UTERINE SCAR FROM PREVIOUS SURGERY: Chronic | ICD-10-CM

## 2022-03-08 LAB
BASOPHILS # BLD AUTO: 0.04 K/UL — SIGNIFICANT CHANGE UP (ref 0–0.2)
BASOPHILS NFR BLD AUTO: 0.4 % — SIGNIFICANT CHANGE UP (ref 0–2)
BLD GP AB SCN SERPL QL: NEGATIVE — SIGNIFICANT CHANGE UP
EOSINOPHIL # BLD AUTO: 0.06 K/UL — SIGNIFICANT CHANGE UP (ref 0–0.5)
EOSINOPHIL NFR BLD AUTO: 0.6 % — SIGNIFICANT CHANGE UP (ref 0–6)
HCT VFR BLD CALC: 34.2 % — LOW (ref 34.5–45)
HGB BLD-MCNC: 11.7 G/DL — SIGNIFICANT CHANGE UP (ref 11.5–15.5)
IMM GRANULOCYTES NFR BLD AUTO: 0.9 % — SIGNIFICANT CHANGE UP (ref 0–1.5)
LYMPHOCYTES # BLD AUTO: 1.74 K/UL — SIGNIFICANT CHANGE UP (ref 1–3.3)
LYMPHOCYTES # BLD AUTO: 17 % — SIGNIFICANT CHANGE UP (ref 13–44)
MCHC RBC-ENTMCNC: 32.2 PG — SIGNIFICANT CHANGE UP (ref 27–34)
MCHC RBC-ENTMCNC: 34.2 GM/DL — SIGNIFICANT CHANGE UP (ref 32–36)
MCV RBC AUTO: 94.2 FL — SIGNIFICANT CHANGE UP (ref 80–100)
MONOCYTES # BLD AUTO: 0.64 K/UL — SIGNIFICANT CHANGE UP (ref 0–0.9)
MONOCYTES NFR BLD AUTO: 6.3 % — SIGNIFICANT CHANGE UP (ref 2–14)
NEUTROPHILS # BLD AUTO: 7.66 K/UL — HIGH (ref 1.8–7.4)
NEUTROPHILS NFR BLD AUTO: 74.8 % — SIGNIFICANT CHANGE UP (ref 43–77)
NRBC # BLD: 0 /100 WBCS — SIGNIFICANT CHANGE UP (ref 0–0)
PLATELET # BLD AUTO: 190 K/UL — SIGNIFICANT CHANGE UP (ref 150–400)
RBC # BLD: 3.63 M/UL — LOW (ref 3.8–5.2)
RBC # FLD: 13 % — SIGNIFICANT CHANGE UP (ref 10.3–14.5)
RH IG SCN BLD-IMP: POSITIVE — SIGNIFICANT CHANGE UP
T PALLIDUM AB TITR SER: NEGATIVE — SIGNIFICANT CHANGE UP
WBC # BLD: 10.23 K/UL — SIGNIFICANT CHANGE UP (ref 3.8–10.5)
WBC # FLD AUTO: 10.23 K/UL — SIGNIFICANT CHANGE UP (ref 3.8–10.5)

## 2022-03-08 PROCEDURE — 59514 CESAREAN DELIVERY ONLY: CPT | Mod: AS,U9

## 2022-03-08 RX ORDER — OXYCODONE HYDROCHLORIDE 5 MG/1
5 TABLET ORAL ONCE
Refills: 0 | Status: DISCONTINUED | OUTPATIENT
Start: 2022-03-08 | End: 2022-03-10

## 2022-03-08 RX ORDER — LANOLIN
1 OINTMENT (GRAM) TOPICAL EVERY 6 HOURS
Refills: 0 | Status: DISCONTINUED | OUTPATIENT
Start: 2022-03-08 | End: 2022-03-10

## 2022-03-08 RX ORDER — KETOROLAC TROMETHAMINE 30 MG/ML
30 SYRINGE (ML) INJECTION ONCE
Refills: 0 | Status: DISCONTINUED | OUTPATIENT
Start: 2022-03-08 | End: 2022-03-08

## 2022-03-08 RX ORDER — CITRIC ACID/SODIUM CITRATE 300-500 MG
15 SOLUTION, ORAL ORAL ONCE
Refills: 0 | Status: COMPLETED | OUTPATIENT
Start: 2022-03-08 | End: 2022-03-08

## 2022-03-08 RX ORDER — HEPARIN SODIUM 5000 [USP'U]/ML
5000 INJECTION INTRAVENOUS; SUBCUTANEOUS EVERY 12 HOURS
Refills: 0 | Status: DISCONTINUED | OUTPATIENT
Start: 2022-03-08 | End: 2022-03-10

## 2022-03-08 RX ORDER — MORPHINE SULFATE 50 MG/1
0.1 CAPSULE, EXTENDED RELEASE ORAL ONCE
Refills: 0 | Status: DISCONTINUED | OUTPATIENT
Start: 2022-03-08 | End: 2022-03-09

## 2022-03-08 RX ORDER — NALOXONE HYDROCHLORIDE 4 MG/.1ML
0.1 SPRAY NASAL
Refills: 0 | Status: DISCONTINUED | OUTPATIENT
Start: 2022-03-08 | End: 2022-03-09

## 2022-03-08 RX ORDER — OXYCODONE HYDROCHLORIDE 5 MG/1
5 TABLET ORAL
Refills: 0 | Status: DISCONTINUED | OUTPATIENT
Start: 2022-03-08 | End: 2022-03-10

## 2022-03-08 RX ORDER — NALBUPHINE HYDROCHLORIDE 10 MG/ML
2.5 INJECTION, SOLUTION INTRAMUSCULAR; INTRAVENOUS; SUBCUTANEOUS EVERY 6 HOURS
Refills: 0 | Status: DISCONTINUED | OUTPATIENT
Start: 2022-03-08 | End: 2022-03-09

## 2022-03-08 RX ORDER — ONDANSETRON 8 MG/1
4 TABLET, FILM COATED ORAL EVERY 6 HOURS
Refills: 0 | Status: DISCONTINUED | OUTPATIENT
Start: 2022-03-08 | End: 2022-03-09

## 2022-03-08 RX ORDER — MAGNESIUM HYDROXIDE 400 MG/1
30 TABLET, CHEWABLE ORAL
Refills: 0 | Status: DISCONTINUED | OUTPATIENT
Start: 2022-03-08 | End: 2022-03-10

## 2022-03-08 RX ORDER — INFLUENZA VIRUS VACCINE 15; 15; 15; 15 UG/.5ML; UG/.5ML; UG/.5ML; UG/.5ML
0.5 SUSPENSION INTRAMUSCULAR ONCE
Refills: 0 | Status: DISCONTINUED | OUTPATIENT
Start: 2022-03-08 | End: 2022-03-10

## 2022-03-08 RX ORDER — SODIUM CHLORIDE 9 MG/ML
1000 INJECTION, SOLUTION INTRAVENOUS
Refills: 0 | Status: DISCONTINUED | OUTPATIENT
Start: 2022-03-08 | End: 2022-03-10

## 2022-03-08 RX ORDER — ACETAMINOPHEN 500 MG
975 TABLET ORAL
Refills: 0 | Status: DISCONTINUED | OUTPATIENT
Start: 2022-03-08 | End: 2022-03-10

## 2022-03-08 RX ORDER — IBUPROFEN 200 MG
600 TABLET ORAL EVERY 6 HOURS
Refills: 0 | Status: COMPLETED | OUTPATIENT
Start: 2022-03-08 | End: 2023-02-04

## 2022-03-08 RX ORDER — DEXAMETHASONE 0.5 MG/5ML
4 ELIXIR ORAL EVERY 6 HOURS
Refills: 0 | Status: DISCONTINUED | OUTPATIENT
Start: 2022-03-08 | End: 2022-03-09

## 2022-03-08 RX ORDER — CEFAZOLIN SODIUM 1 G
2000 VIAL (EA) INJECTION ONCE
Refills: 0 | Status: DISCONTINUED | OUTPATIENT
Start: 2022-03-08 | End: 2022-03-10

## 2022-03-08 RX ORDER — FAMOTIDINE 10 MG/ML
20 INJECTION INTRAVENOUS ONCE
Refills: 0 | Status: COMPLETED | OUTPATIENT
Start: 2022-03-08 | End: 2022-03-08

## 2022-03-08 RX ORDER — DIPHENHYDRAMINE HCL 50 MG
25 CAPSULE ORAL EVERY 6 HOURS
Refills: 0 | Status: COMPLETED | OUTPATIENT
Start: 2022-03-08 | End: 2023-02-04

## 2022-03-08 RX ORDER — CEFAZOLIN SODIUM 1 G
VIAL (EA) INJECTION
Refills: 0 | Status: DISCONTINUED | OUTPATIENT
Start: 2022-03-08 | End: 2022-03-10

## 2022-03-08 RX ORDER — OXYTOCIN 10 UNIT/ML
333.33 VIAL (ML) INJECTION
Qty: 20 | Refills: 0 | Status: DISCONTINUED | OUTPATIENT
Start: 2022-03-08 | End: 2022-03-10

## 2022-03-08 RX ORDER — SIMETHICONE 80 MG/1
80 TABLET, CHEWABLE ORAL EVERY 4 HOURS
Refills: 0 | Status: DISCONTINUED | OUTPATIENT
Start: 2022-03-08 | End: 2022-03-10

## 2022-03-08 RX ORDER — IBUPROFEN 200 MG
600 TABLET ORAL EVERY 6 HOURS
Refills: 0 | Status: DISCONTINUED | OUTPATIENT
Start: 2022-03-08 | End: 2022-03-10

## 2022-03-08 RX ORDER — CHLORHEXIDINE GLUCONATE 213 G/1000ML
1 SOLUTION TOPICAL ONCE
Refills: 0 | Status: COMPLETED | OUTPATIENT
Start: 2022-03-08 | End: 2022-03-08

## 2022-03-08 RX ORDER — ACETAMINOPHEN 500 MG
975 TABLET ORAL
Refills: 0 | Status: DISCONTINUED | OUTPATIENT
Start: 2022-03-08 | End: 2022-03-09

## 2022-03-08 RX ORDER — SODIUM CHLORIDE 9 MG/ML
1000 INJECTION, SOLUTION INTRAVENOUS ONCE
Refills: 0 | Status: COMPLETED | OUTPATIENT
Start: 2022-03-08 | End: 2022-03-08

## 2022-03-08 RX ORDER — CEFAZOLIN SODIUM 1 G
2000 VIAL (EA) INJECTION EVERY 8 HOURS
Refills: 0 | Status: DISCONTINUED | OUTPATIENT
Start: 2022-03-09 | End: 2022-03-10

## 2022-03-08 RX ORDER — TETANUS TOXOID, REDUCED DIPHTHERIA TOXOID AND ACELLULAR PERTUSSIS VACCINE, ADSORBED 5; 2.5; 8; 8; 2.5 [IU]/.5ML; [IU]/.5ML; UG/.5ML; UG/.5ML; UG/.5ML
0.5 SUSPENSION INTRAMUSCULAR ONCE
Refills: 0 | Status: DISCONTINUED | OUTPATIENT
Start: 2022-03-08 | End: 2022-03-10

## 2022-03-08 RX ADMIN — Medication 30 MILLIGRAM(S): at 21:34

## 2022-03-08 RX ADMIN — FAMOTIDINE 20 MILLIGRAM(S): 10 INJECTION INTRAVENOUS at 13:58

## 2022-03-08 RX ADMIN — Medication 30 MILLIGRAM(S): at 02:20

## 2022-03-08 RX ADMIN — NALBUPHINE HYDROCHLORIDE 2.5 MILLIGRAM(S): 10 INJECTION, SOLUTION INTRAMUSCULAR; INTRAVENOUS; SUBCUTANEOUS at 16:34

## 2022-03-08 RX ADMIN — Medication 1000 MILLIUNIT(S)/MIN: at 19:35

## 2022-03-08 RX ADMIN — HEPARIN SODIUM 5000 UNIT(S): 5000 INJECTION INTRAVENOUS; SUBCUTANEOUS at 23:51

## 2022-03-08 RX ADMIN — Medication 975 MILLIGRAM(S): at 18:40

## 2022-03-08 RX ADMIN — Medication 975 MILLIGRAM(S): at 23:50

## 2022-03-08 RX ADMIN — Medication 15 MILLILITER(S): at 13:58

## 2022-03-08 RX ADMIN — CHLORHEXIDINE GLUCONATE 1 APPLICATION(S): 213 SOLUTION TOPICAL at 13:59

## 2022-03-08 RX ADMIN — SODIUM CHLORIDE 2000 MILLILITER(S): 9 INJECTION, SOLUTION INTRAVENOUS at 13:57

## 2022-03-08 RX ADMIN — Medication 100 MILLIGRAM(S): at 22:37

## 2022-03-08 NOTE — OB PROVIDER DELIVERY SUMMARY - NSSELHIDDEN_OBGYN_ALL_OB_FT
[NS_DeliveryAttending1_OBGYN_ALL_OB_FT:[email or web address]=],[NS_DeliveryAssist1_OBGYN_ALL_OB_FT:[id number]==],[NS_DeliveryRN_OBGYN_ALL_OB_FT:[id number]==]

## 2022-03-08 NOTE — OB RN DELIVERY SUMMARY - NS_SEPSISRSKCALC_OBGYN_ALL_OB_FT
EOS calculated successfully. EOS Risk Factor: 0.5/1000 live births ([facility] national incidence); GA=39w;Temp=98.4; ROM=0; GBS='Negative'; Antibiotics='No antibiotics or any antibiotics < 2 hrs prior to birth'

## 2022-03-08 NOTE — OB RN DELIVERY SUMMARY - NSSTERILIZETYPE_OBGYN_ALL_OB
Assessment  DMT2: 70y Male with DM T2 with hyperglycemia, A1C 6.4%, was on insulin at home, now on low-dose basal insulin and coverage, blood sugars are improving no hypoglycemias.  Patient is eating meals, NAD, DC planning for rehab delayed 2/2 psych/agitation, Palliative Care on board, planning family discussion for GOC.  Hypothyroidism: Patient has no history thyroid disease, was not on any thyroid supplements, subclinical with low-normal FT4, lethargic, started on synthroid 25 mcg po daily, FT4 improved to 1.2.  CHF: on medications, stable, monitored.  HTN: Controlled,  on antihypertensive medications.  Parkinsons: on meds, monitored.  CKD: Monitor labs/BMP      Kelsie Reece MD  Cell: [phone]  Office: [phone]   None

## 2022-03-08 NOTE — OB RN PATIENT PROFILE - HBSAG: DATE, OB PROFILE
Previous order was sent to [address] [place] [address] Sw does not need new referral--she should call Dee CYR---attempted to call pt--she will not be available until tomorrow around noon--will call back then 09-Aug-2021

## 2022-03-08 NOTE — OB RN INTRAOPERATIVE NOTE - NSSELHIDDEN_OBGYN_ALL_OB_FT
[NS_DeliveryAttending1_OBGYN_ALL_OB_FT:[email or web address]=],[NS_DeliveryAssist1_OBGYN_ALL_OB_FT:[id number]] [NS_DeliveryAttending1_OBGYN_ALL_OB_FT:[email or web address]=],[NS_DeliveryAssist1_OBGYN_ALL_OB_FT:[id number]==],[NS_DeliveryRN_OBGYN_ALL_OB_FT:[id number]==]

## 2022-03-08 NOTE — OB NEONATOLOGY/PEDIATRICIAN DELIVERY SUMMARY - NSPEDSNEONOTESA_OBGYN_ALL_OB_FT
39wk male born via CS to a 27 y/o  blood type A+ mother. Maternal history of prior C/S x1, and hx of breast fibroadenoma. Prenatal history of first trimester bleeding, and low fetal growth, with growth sono 2 weeks ago having EFW of 5.11lbs. No significant maternal or prenatal history. PNL -/-/NR/I, GBS -  at . ROM at C/S  with clear fluids. Baby emerged vigorous, crying, was w/d/s/s with APGARS of 8/9. Baby passed meconium at delivery Mom plans to initiate breastfeeding feed, consents Hep B vaccine and consents Circ. EOS 0.06  highest maternal temp 36.9    Physical Exam:  Gen: no acute distress, +grimace  HEENT:  anterior fontanel open soft and flat, nondysmoprhic facies, no cleft lip/palate, ears normal set, no ear pits or tags, nares clinically patent  Resp: Normal respiratory effort without grunting or retractions, good air entry b/l, clear to auscultation bilaterally  Cardio: Present S1/S2, regular rate and rhythm, no murmurs  Abd: soft, non tender, non distended, umbilical cord with 3 vessels  Neuro: +palmar and plantar grasp, +suck, +bonilla, normal tone  Extremities: negative junior and ortolani maneuvers, moving all extremities, no clavicular crepitus or stepoff  Skin: pink, warm  Genitals: Normal male anatomy, testicles palpable in scrotum b/l, Charbel 1, anus patent

## 2022-03-08 NOTE — OB PROVIDER H&P - ASSESSMENT
26yoF  @39 weeks THELMA 3/15/22 admitted for scheduled repeat . Cat 1 tracing.  - Admit to L&D  - Routine labs  - IVF  - NPO  - Pre-op orders   - Anesthesia consult   - EFM/[place] continuous monitoring     Marie Boston PA-C

## 2022-03-08 NOTE — OB RN PATIENT PROFILE - FALL HARM RISK - UNIVERSAL INTERVENTIONS
Bed in lowest position, wheels locked, appropriate side rails in place/Call bell, personal items and telephone in reach/Instruct patient to call for assistance before getting out of bed or chair/Non-slip footwear when patient is out of bed/[place] to call system/Physically safe environment - no spills, clutter or unnecessary equipment/Purposeful Proactive Rounding/Room/bathroom lighting operational, light cord in reach

## 2022-03-08 NOTE — OB PROVIDER DELIVERY SUMMARY - NSPROVIDERDELIVERYNOTE_OBGYN_ALL_OB_FT
rLTCS for viable male infant, apgars 8/9  Grossly normal uterus  Excellent hemostasis  Mother and baby to recovery room in stable condition    Surgeon MD Perera  Assistant NP Hammad      IVF 1500

## 2022-03-08 NOTE — OB PROVIDER H&P - HISTORY OF PRESENT ILLNESS
26yoF  @39 weeks THELMA 3/15/22 presents for scheduled repeat . Endorses FM. Denies -LOF, -VB, -CTX. Denies any other concern.     PNC: GBS negative. Hep B negative HIV negative. EFW 2900.   OBHx: () s/p pLTCS FT 2/2 NRFHT, uncomplicated 6#11. 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

## 2022-03-09 LAB
BASOPHILS # BLD AUTO: 0.03 K/UL — SIGNIFICANT CHANGE UP (ref 0–0.2)
BASOPHILS NFR BLD AUTO: 0.2 % — SIGNIFICANT CHANGE UP (ref 0–2)
EOSINOPHIL # BLD AUTO: 0.03 K/UL — SIGNIFICANT CHANGE UP (ref 0–0.5)
EOSINOPHIL NFR BLD AUTO: 0.2 % — SIGNIFICANT CHANGE UP (ref 0–6)
HCT VFR BLD CALC: 33.9 % — LOW (ref 34.5–45)
HGB BLD-MCNC: 11.5 G/DL — SIGNIFICANT CHANGE UP (ref 11.5–15.5)
IMM GRANULOCYTES NFR BLD AUTO: 0.7 % — SIGNIFICANT CHANGE UP (ref 0–1.5)
LYMPHOCYTES # BLD AUTO: 13.9 % — SIGNIFICANT CHANGE UP (ref 13–44)
LYMPHOCYTES # BLD AUTO: 2.17 K/UL — SIGNIFICANT CHANGE UP (ref 1–3.3)
MCHC RBC-ENTMCNC: 31.9 PG — SIGNIFICANT CHANGE UP (ref 27–34)
MCHC RBC-ENTMCNC: 33.9 GM/DL — SIGNIFICANT CHANGE UP (ref 32–36)
MCV RBC AUTO: 93.9 FL — SIGNIFICANT CHANGE UP (ref 80–100)
MONOCYTES # BLD AUTO: 0.99 K/UL — HIGH (ref 0–0.9)
MONOCYTES NFR BLD AUTO: 6.3 % — SIGNIFICANT CHANGE UP (ref 2–14)
NEUTROPHILS # BLD AUTO: 12.33 K/UL — HIGH (ref 1.8–7.4)
NEUTROPHILS NFR BLD AUTO: 78.7 % — HIGH (ref 43–77)
NRBC # BLD: 0 /100 WBCS — SIGNIFICANT CHANGE UP (ref 0–0)
PLATELET # BLD AUTO: 200 K/UL — SIGNIFICANT CHANGE UP (ref 150–400)
RBC # BLD: 3.61 M/UL — LOW (ref 3.8–5.2)
RBC # FLD: 12.7 % — SIGNIFICANT CHANGE UP (ref 10.3–14.5)
WBC # BLD: 15.66 K/UL — HIGH (ref 3.8–10.5)
WBC # FLD AUTO: 15.66 K/UL — HIGH (ref 3.8–10.5)

## 2022-03-09 RX ORDER — KETOROLAC TROMETHAMINE 30 MG/ML
30 SYRINGE (ML) INJECTION EVERY 6 HOURS
Refills: 0 | Status: DISCONTINUED | OUTPATIENT
Start: 2022-03-09 | End: 2022-03-09

## 2022-03-09 RX ORDER — SENNA PLUS 8.6 MG/1
2 TABLET ORAL AT BEDTIME
Refills: 0 | Status: DISCONTINUED | OUTPATIENT
Start: 2022-03-09 | End: 2022-03-10

## 2022-03-09 RX ORDER — DIPHENHYDRAMINE HCL 50 MG
25 CAPSULE ORAL EVERY 6 HOURS
Refills: 0 | Status: DISCONTINUED | OUTPATIENT
Start: 2022-03-09 | End: 2022-03-10

## 2022-03-09 RX ADMIN — Medication 975 MILLIGRAM(S): at 01:20

## 2022-03-09 RX ADMIN — Medication 30 MILLIGRAM(S): at 15:35

## 2022-03-09 RX ADMIN — Medication 100 MILLIGRAM(S): at 06:28

## 2022-03-09 RX ADMIN — Medication 975 MILLIGRAM(S): at 12:07

## 2022-03-09 RX ADMIN — Medication 100 MILLIGRAM(S): at 14:33

## 2022-03-09 RX ADMIN — Medication 30 MILLIGRAM(S): at 03:27

## 2022-03-09 RX ADMIN — Medication 975 MILLIGRAM(S): at 12:37

## 2022-03-09 RX ADMIN — Medication 30 MILLIGRAM(S): at 21:45

## 2022-03-09 RX ADMIN — Medication 975 MILLIGRAM(S): at 18:24

## 2022-03-09 RX ADMIN — Medication 30 MILLIGRAM(S): at 15:05

## 2022-03-09 RX ADMIN — NALBUPHINE HYDROCHLORIDE 2.5 MILLIGRAM(S): 10 INJECTION, SOLUTION INTRAMUSCULAR; INTRAVENOUS; SUBCUTANEOUS at 08:01

## 2022-03-09 RX ADMIN — Medication 25 MILLIGRAM(S): at 01:22

## 2022-03-09 RX ADMIN — Medication 975 MILLIGRAM(S): at 23:55

## 2022-03-09 RX ADMIN — Medication 975 MILLIGRAM(S): at 06:28

## 2022-03-09 RX ADMIN — HEPARIN SODIUM 5000 UNIT(S): 5000 INJECTION INTRAVENOUS; SUBCUTANEOUS at 14:33

## 2022-03-09 RX ADMIN — Medication 30 MILLIGRAM(S): at 21:06

## 2022-03-09 RX ADMIN — Medication 975 MILLIGRAM(S): at 23:15

## 2022-03-09 RX ADMIN — Medication 30 MILLIGRAM(S): at 02:57

## 2022-03-09 RX ADMIN — Medication 975 MILLIGRAM(S): at 06:58

## 2022-03-09 RX ADMIN — Medication 30 MILLIGRAM(S): at 09:34

## 2022-03-09 RX ADMIN — Medication 975 MILLIGRAM(S): at 18:54

## 2022-03-09 RX ADMIN — Medication 30 MILLIGRAM(S): at 10:04

## 2022-03-09 RX ADMIN — SENNA PLUS 2 TABLET(S): 8.6 TABLET ORAL at 23:14

## 2022-03-09 RX ADMIN — MAGNESIUM HYDROXIDE 30 MILLILITER(S): 400 TABLET, CHEWABLE ORAL at 21:06

## 2022-03-09 NOTE — PROGRESS NOTE ADULT - ASSESSMENT
A/P:  26y     P2    S/P  repeat   section   POD # 1, doing well    PAST MEDICAL & SURGICAL HISTORY:  H/O fibrocystic disease of breast    H/O right breast biopsy    H/O:       Current Issues: none

## 2022-03-10 ENCOUNTER — TRANSCRIPTION ENCOUNTER (OUTPATIENT)
Age: 27
End: 2022-03-10

## 2022-03-10 VITALS
HEART RATE: 79 BPM | TEMPERATURE: 98 F | DIASTOLIC BLOOD PRESSURE: 67 MMHG | RESPIRATION RATE: 18 BRPM | SYSTOLIC BLOOD PRESSURE: 101 MMHG | OXYGEN SATURATION: 98 %

## 2022-03-10 PROCEDURE — 86850 RBC ANTIBODY SCREEN: CPT

## 2022-03-10 PROCEDURE — 85025 COMPLETE CBC W/AUTO DIFF WBC: CPT

## 2022-03-10 PROCEDURE — 59050 FETAL MONITOR W/REPORT: CPT

## 2022-03-10 PROCEDURE — 86901 BLOOD TYPING SEROLOGIC RH(D): CPT

## 2022-03-10 PROCEDURE — 86900 BLOOD TYPING SEROLOGIC ABO: CPT

## 2022-03-10 PROCEDURE — 59025 FETAL NON-STRESS TEST: CPT

## 2022-03-10 PROCEDURE — 86780 TREPONEMA PALLIDUM: CPT

## 2022-03-10 RX ORDER — FOLIC ACID 0.8 MG
1 TABLET ORAL
Qty: 0 | Refills: 0 | DISCHARGE

## 2022-03-10 RX ORDER — FERROUS SULFATE 325(65) MG
1 TABLET ORAL
Qty: 0 | Refills: 0 | DISCHARGE

## 2022-03-10 RX ORDER — IBUPROFEN 200 MG
1 TABLET ORAL
Qty: 0 | Refills: 0 | DISCHARGE
Start: 2022-03-10

## 2022-03-10 RX ORDER — PREGABALIN 225 MG/1
1 CAPSULE ORAL
Qty: 0 | Refills: 0 | DISCHARGE

## 2022-03-10 RX ORDER — LACTOBACILLUS ACIDOPHILUS 100MM CELL
1 CAPSULE ORAL
Qty: 0 | Refills: 0 | DISCHARGE

## 2022-03-10 RX ORDER — ACETAMINOPHEN 500 MG
3 TABLET ORAL
Qty: 0 | Refills: 0 | DISCHARGE
Start: 2022-03-10

## 2022-03-10 RX ORDER — IBUPROFEN 200 MG
600 TABLET ORAL EVERY 6 HOURS
Refills: 0 | Status: DISCONTINUED | OUTPATIENT
Start: 2022-03-10 | End: 2022-03-10

## 2022-03-10 RX ADMIN — Medication 975 MILLIGRAM(S): at 05:21

## 2022-03-10 RX ADMIN — Medication 975 MILLIGRAM(S): at 11:21

## 2022-03-10 RX ADMIN — HEPARIN SODIUM 5000 UNIT(S): 5000 INJECTION INTRAVENOUS; SUBCUTANEOUS at 03:26

## 2022-03-10 RX ADMIN — Medication 600 MILLIGRAM(S): at 03:31

## 2022-03-10 RX ADMIN — Medication 975 MILLIGRAM(S): at 06:15

## 2022-03-10 RX ADMIN — Medication 600 MILLIGRAM(S): at 09:27

## 2022-03-10 RX ADMIN — OXYCODONE HYDROCHLORIDE 5 MILLIGRAM(S): 5 TABLET ORAL at 09:27

## 2022-03-10 RX ADMIN — MAGNESIUM HYDROXIDE 30 MILLILITER(S): 400 TABLET, CHEWABLE ORAL at 05:21

## 2022-03-10 RX ADMIN — Medication 600 MILLIGRAM(S): at 10:00

## 2022-03-10 NOTE — DISCHARGE NOTE OB - NS MD DC FALL RISK RISK
For information on Fall & Injury Prevention, visit: [email or web address] OR  [email or web address] OR  https://www.cdc.gov/steadi/patient.html

## 2022-03-10 NOTE — DISCHARGE NOTE OB - CARE PROVIDER_API CALL
Lora Perera  OBSTETRICS AND GYNECOLOGY  [address], [address]  [place] [address]  Phone: [phone]  Fax: [phone]  Follow Up Time:

## 2022-03-10 NOTE — DISCHARGE NOTE OB - PRINCIPAL DIAGNOSIS
----- Message from Huey Orantes MD sent at 8/30/2018  3:03 PM CDT -----  Labs all ok. Lyme titer is negative.   H/O  section

## 2022-03-10 NOTE — PROGRESS NOTE ADULT - SUBJECTIVE AND OBJECTIVE BOX
Day 1 of Anesthesia Pain Management Service    SUBJECTIVE: Doing ok  Pain Scale Score:          [X] Refer to charted pain scores    THERAPY:    s/p   100  mcg PF morphine on 3\8\2022      MEDICATIONS  (STANDING):  acetaminophen     Tablet .. 975 milliGRAM(s) Oral <User Schedule>  ceFAZolin   IVPB 2000 milliGRAM(s) IV Intermittent once  ceFAZolin   IVPB      ceFAZolin   IVPB 2000 milliGRAM(s) IV Intermittent once  ceFAZolin   IVPB 2000 milliGRAM(s) IV Intermittent every 8 hours  diphtheria/tetanus/pertussis (acellular) Vaccine (ADAcel) 0.5 milliLiter(s) IntraMuscular once  heparin   Injectable 5000 Unit(s) SubCutaneous every 12 hours  ibuprofen  Tablet. 600 milliGRAM(s) Oral every 6 hours  ibuprofen  Tablet. 600 milliGRAM(s) Oral every 6 hours  influenza   Vaccine 0.5 milliLiter(s) IntraMuscular once  ketorolac   Injectable 30 milliGRAM(s) IV Push every 6 hours  lactated ringers. 1000 milliLiter(s) (125 mL/Hr) IV Continuous <Continuous>  morphine PF Spinal 0.1 milliGRAM(s) IntraThecal. once  oxytocin Infusion 333.333 milliUNIT(s)/Min (1000 mL/Hr) IV Continuous <Continuous>    MEDICATIONS  (PRN):  dexAMETHasone  Injectable 4 milliGRAM(s) IV Push every 6 hours PRN Nausea  diphenhydrAMINE 25 milliGRAM(s) Oral every 6 hours PRN Pruritus  lanolin Ointment 1 Application(s) Topical every 6 hours PRN Sore Nipples  magnesium hydroxide Suspension 30 milliLiter(s) Oral two times a day PRN Constipation  nalbuphine Injectable 2.5 milliGRAM(s) IV Push every 6 hours PRN Pruritus  naloxone Injectable 0.1 milliGRAM(s) IV Push every 3 minutes PRN For ANY of the following changes in patient status:  A. Breaths Per Minute LESS THAN 10, B. Oxygen saturation LESS THAN 90%, C. Sedation score of 6 for Stop After: 4 Times  ondansetron Injectable 4 milliGRAM(s) IV Push every 6 hours PRN Nausea  oxyCODONE    IR 5 milliGRAM(s) Oral every 3 hours PRN Moderate to Severe Pain (4-10)  oxyCODONE    IR 5 milliGRAM(s) Oral once PRN Moderate to Severe Pain (4-10)  oxyCODONE    IR 5 milliGRAM(s) Oral every 3 hours PRN Moderate to Severe Pain (4-10)  oxyCODONE    IR 5 milliGRAM(s) Oral once PRN Moderate to Severe Pain (4-10)  simethicone 80 milliGRAM(s) Chew every 4 hours PRN Gas      OBJECTIVE:    Sedation:        	[X] Alert	 [ ] Drowsy	[ ] Arousable      [ ] Asleep       [ ] Unresponsive    Side Effects:	[ ] None 	[ ] Nausea	[ ] Vomiting         [X ] Pruritus: Nubain prn  		[ ] Weakness            [ ] Numbness	          [ ] Other:    Vital Signs Last 24 Hrs  T(C): 36.6 (09 Mar 2022 05:00), Max: 36.9 (08 Mar 2022 13:09)  T(F): 97.8 (09 Mar 2022 05:00), Max: 98.4 (08 Mar 2022 13:09)  HR: 75 (09 Mar 2022 05:00) (66 - 78)  BP: 95/57 (09 Mar 2022 05:00) (89/65 - 103/64)  BP(mean): 75 (08 Mar 2022 18:15) (70 - 76)  RR: 17 (09 Mar 2022 05:00) (16 - 18)  SpO2: 100% (09 Mar 2022 05:00) (97% - 100%)    ASSESSMENT/ PLAN  [X] Patient to be transitioned to prn analgesics later today  [X] Pain management per primary service, pain service to sign off   [X]Documentation and Verification of current medications
Day 1 of Anesthesia Pain Management Service    SUBJECTIVE:  Pain Scale Score:          [X] Refer to charted pain scores    THERAPY: Received PF spinal morphine as above    OBJECTIVE:    Sedation:        	[X] Alert	[ ] Drowsy	[ ] Arousable      [ ] Asleep       [ ] Unresponsive    Side Effects:	[] None	[ ] Nausea	[ ] Vomiting         [ X] Pruritus  		[ ] Weakness            [ ] Numbness	          [ ] Other:    ASSESSMENT/ PLAN  [X] Patient transitioned to prn analgesics  [X] Pain management per primary service, pain service to sign off   [X]Documentation and Verification of current medications
Postpartum Note-  Section POD#2      Rubella: Immune                  RPR:  negative              Blood Type: A positive    S:Patient is a  26y P2 now POD#2 s/p repeat LTCS   Subjective: Patient w/o complaints, pain is controlled.  Pt is OOB, tolerating PO, passing flatus, and voiding. Lochia WNL.   Feeding: breastfeeding     O:  Vital Signs Last 24 Hrs  T(C): 36.9 (10 Mar 2022 05:27), Max: 37 (09 Mar 2022 09:32)  T(F): 98.4 (10 Mar 2022 05:27), Max: 98.6 (09 Mar 2022 09:32)  HR: 79 (10 Mar 2022 05:27) (69 - 84)  BP: 101/67 (10 Mar 2022 05:27) (101/67 - 112/78)  BP(mean): --  RR: 18 (10 Mar 2022 05:27) (18 - 18)  SpO2: 98% (10 Mar 2022 05:27) (98% - 100%)      Gen: NAD  CV: rrr s1s2, CTABL  Abdomen: Soft, nontender, non distened, fundus firm.  Bowel Sounds x 4 quadrants  Incision: Clean, dry, and intact.  Negative erythema/edema/ecchymosis.   SubQ  Lochia WNL  Ext: Neg edema, Neg calf tenderness.  Pedal pulses palpated B/L    LABS:                          11.5   15.66 )-----------( 200      ( 09 Mar 2022 06:44 )             33.9               
 Postpartum Note-  Section POD#1    Allergies    No Known Allergies    Intolerances      Blood Type A Positive     RPR Negative    Rubella: Immune    S:Patient is a  26y    P2    POD#1 S/P repeat C/Sec  Patient w/o complaints, pain is controlled.  Pt is OOB, tolerating PO, passing flatus. Lochia WNL. + void    Feeding: Breast    O:  Vital Signs Last 24 Hrs  T(C): 36.6 (09 Mar 2022 05:00), Max: 36.9 (08 Mar 2022 13:09)  T(F): 97.8 (09 Mar 2022 05:00), Max: 98.4 (08 Mar 2022 13:09)  HR: 75 (09 Mar 2022 05:00) (66 - 78)  BP: 95/57 (09 Mar 2022 05:00) (89/65 - 103/64)  BP(mean): 75 (08 Mar 2022 18:15) (70 - 76)  RR: 17 (09 Mar 2022 05:00) (16 - 18)  SpO2: 100% (09 Mar 2022 05:00) (97% - 100%)  I&O's Summary    08 Mar 2022 07:01  -  09 Mar 2022 07:00  --------------------------------------------------------  IN: 1000 mL / OUT: 3250 mL / NET: -2250 mL        Gen: NAD  Abdomen: +BS, Soft, nontender, non-distended, fundus firm.  Incision: Clean, dry, and intact.  Negative erythema/edema/ecchymosis   Sub Q/dermabond  Lochia WNL  Ext: SCDs in place. Negative Homans B/L    LABS:                          11.5   15.66 )-----------( 200      ( 09 Mar 2022 06:44 )             33.9

## 2022-03-10 NOTE — DISCHARGE NOTE OB - CARE PLAN
1 Principal Discharge DX:	H/O  section  Assessment and plan of treatment:	return to baseline health, regular diet, pain control, follow up with Dr Perera

## 2022-03-10 NOTE — DISCHARGE NOTE OB - PATIENT PORTAL LINK FT
You can access the FollowMyHealth Patient Portal offered by [facility] by registering at the following website: [email or web address]. By joining [employer]’s FollowMyHealth portal, you will also be able to view your health information using other applications (apps) compatible with our system.

## 2022-03-10 NOTE — PROGRESS NOTE ADULT - ASSESSMENT
A/P:  26y P2 now POD#2 s/p repeat LTCS     PMHx:  Current Issues:    Increase OOB  PO Pain Protocol  Continue Regular Diet  Continue Routine Postop/Postpartum Care    Audra Rollins PA-C

## 2022-03-10 NOTE — DISCHARGE NOTE OB - MEDICATION SUMMARY - MEDICATIONS TO TAKE
I will START or STAY ON the medications listed below when I get home from the hospital:    acetaminophen 325 mg oral tablet  -- 3 tab(s) by mouth every 6 hours  -- Indication: For mild pain    ibuprofen 600 mg oral tablet  -- 1 tab(s) by mouth every 6 hours  -- Indication: For Cramping    ibuprofen 600 mg oral tablet  -- 1 tab(s) by mouth every 6 hours  -- Indication: For Cramping

## 2022-05-20 NOTE — OB PROVIDER TRIAGE NOTE - NS_PHYSICALALLNEG_OBGYN_ALL_OB
Removed Chaparro needle from right chest wall mediport. All other review of systems negative, except as noted in HPI

## 2022-11-14 ENCOUNTER — OFFICE (OUTPATIENT)
Dept: URBAN - METROPOLITAN AREA CLINIC 12 | Facility: CLINIC | Age: 27
Setting detail: OPHTHALMOLOGY
End: 2022-11-14
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
## 2022-11-14 PROCEDURE — NO SHOW FE NO SHOW FEE: Performed by: OPTOMETRIST

## 2022-12-12 ENCOUNTER — OFFICE (OUTPATIENT)
Dept: URBAN - METROPOLITAN AREA CLINIC 12 | Facility: CLINIC | Age: 27
Setting detail: OPHTHALMOLOGY
End: 2022-12-12
Payer: COMMERCIAL

## 2022-12-12 DIAGNOSIS — H52.13: ICD-10-CM

## 2022-12-12 PROBLEM — H16.223 DRY EYE SYNDROME K SICCA; BOTH EYES: Status: ACTIVE | Noted: 2022-12-12

## 2022-12-12 PROCEDURE — 99024 POSTOP FOLLOW-UP VISIT: CPT | Performed by: OPTOMETRIST

## 2022-12-12 ASSESSMENT — REFRACTION_CURRENTRX
OS_OVR_VA: 20/
OD_AXIS: 167
OS_AXIS: 007
OD_VPRISM_DIRECTION: SV
OD_OVR_VA: 20/
OD_CYLINDER: -0.75
OS_SPHERE: -3.00
OS_VPRISM_DIRECTION: SV
OS_CYLINDER: -0.75
OD_SPHERE: -3.00

## 2022-12-12 ASSESSMENT — KERATOMETRY
OD_AXISANGLE_DEGREES: 083
OD_K2POWER_DIOPTERS: 39.50
OS_K2POWER_DIOPTERS: 39.75
OD_K1POWER_DIOPTERS: 38.75
METHOD_AUTO_MANUAL: AUTO
OS_AXISANGLE_DEGREES: 095
OS_K1POWER_DIOPTERS: 39.00

## 2022-12-12 ASSESSMENT — VISUAL ACUITY
OD_BCVA: 20/15-1
OS_BCVA: 20/15

## 2022-12-12 ASSESSMENT — REFRACTION_MANIFEST
OD_CYLINDER: -1.00
OD_AXIS: 173
OD_CYLINDER: -0.75
OD_AXIS: 172
OD_SPHERE: -2.75
OS_AXIS: 015
OS_SPHERE: -2.75
OS_SPHERE: -2.75
OD_VA1: 20/20
OD_SPHERE: -3.00
OS_AXIS: 020
OS_CYLINDER: -0.75
OS_CYLINDER: -0.75
OS_VA1: 20/20

## 2022-12-12 ASSESSMENT — SPHEQUIV_DERIVED
OD_SPHEQUIV: -3.125
OS_SPHEQUIV: -1.125
OS_SPHEQUIV: -3.125
OS_SPHEQUIV: -3.125
OD_SPHEQUIV: -0.5
OD_SPHEQUIV: -3.5

## 2022-12-12 ASSESSMENT — REFRACTION_AUTOREFRACTION
OD_AXIS: 055
OS_SPHERE: -1.00
OS_CYLINDER: -0.25
OS_AXIS: 014
OD_CYLINDER: -0.50
OD_SPHERE: -0.25

## 2022-12-12 ASSESSMENT — TEAR BREAK UP TIME (TBUT)
OS_TBUT: T
OD_TBUT: T

## 2022-12-12 ASSESSMENT — AXIALLENGTH_DERIVED
OD_AL: 25.5427
OS_AL: 26.6826
OD_AL: 26.8005
OS_AL: 26.6826
OS_AL: 25.7217
OD_AL: 26.9904

## 2022-12-12 ASSESSMENT — CONFRONTATIONAL VISUAL FIELD TEST (CVF)
OD_FINDINGS: FULL
OS_FINDINGS: FULL

## 2022-12-12 ASSESSMENT — TONOMETRY: OS_IOP_MMHG: 12

## 2023-11-17 NOTE — DISCHARGE NOTE OB - AVOID SITTING IN ONE POSITION FOR MORE THAN ONE HOUR
11/17/2023   [facility]  N/A  N?u có th?c m?c v? michael sácira laura nguyên này ho?c các ismael c?u ch?m sóc b? sunevie, fabby lòng liên h? v?i phòng khám ch?m sóc chính ho?c ng??i qu?n lý ch?m sóc c?a b?n.  Phone: [phone]   Email: N/A   Address: [address] [place] [id number]   Hours: N/A        ???ng dây nóng và ???ng dây tr? giúp       ???ng dây nóng - Kh?ng ho?ng nhà ?  1  [facility] (V?n phòng chính) - Các d?ch v? kh?n c?p Oumar?ng cách: 1.75 d?m      ?i?n tho?i/?o   [address] [place] [address]  Ngôn ng?: Ti?ng Shadia  Gi?: Th? timo - ch? nh?t M? c?a 24 gi?   Phone: [phone] Email: [email or web address] Website: [email or web address]     2  Nhà ? c?a ??ng C?u R?i Oumar?ng cách: 8.76 d?m      ?i?n tho?i/?o   [address] [place] [address]  Ngôn ng?: Ti?ng Shadia  Gi?: Th? timo - ch? nh?t M? c?a 24 gi?   Phone: [phone] Email: [email or web address] Website: [email or web address]          Nhà ?       ?i?m truy c?p m?c nh?p ph?i h?p  3  D?ch v? xã h?i [employer] c?a [place] ([facility]) - D?ch v? nhà ? Oumar?ng cách: 8.66 d?m      M?t ??i m?t   [address] [place] [address]  Ngôn ng?: Ti?ng Shadia  Gi?: Th? timo - Th? sáu 9:00 SA - 5:00 CH  Phí: Mi?n phí   Phone: [phone] Email: [email or web address] Website: [email or web address]     4  Nhà th? Công giáo Raizah Henri - K?t n?i n?i trú ?n dành cho ng??i l?n - Qu?n Lb Oumar?ng cách: 9.76 d?m      M?t ??i m?t   [address] [place] [address]  Ngôn ng?: Ti?ng Shadia  Gi?: Th? timo - ngày th? b?y 10:00 CH - 5:00 CH  Phí: Mi?n phí   Phone: [phone] Email: info@[email or web address] Website: http://www.[email or web address]/     Edi tâm t?m trú ho?c n?i trú ?n ban ngày  5  C?ng ??ng Ngôi nhà [facility] yên Oumar?ng cách: 9.08 d?m      M?t ??i m?t   [address] [place] [address]  Ngôn ng?: Ti?ng Shadia  Gi?: Th? hai - Th? sáu 12:00 CH - 3:00 CH  Phí: Mi?n phí   Phone: [phone] Email: [email or web address] Website: [email or web address]     6  T? ch?c t? thi?n Công giáo [facility] tâm C? h?i Oumar?ng cách: 9.2 d?m      M?t ??i m?t   [address] [place] [address]  Ngôn ng?: ng??i Tây Ban Nha, Ti?ng Shadia, ti?ng [employer]  Gi?: Th? timo - ngày th? b?y 7:00 SA - 3:00 CH  Phí: Mi?n phí, T? tr?   Phone: [phone] Email: [email or web address] Website: [email or web address]://[email or web address]/locations/[email or web address]-center/     H? tr? tìm ki?m nhà ?  7  C? yung Tái phát tri?n & Nhà ? [place] - ??ng trình Nhà cho thuê dành cho Ng??i yoana Nhà T??ng johnston Oumar?ng cách: 2.47 d?m      ?i?n tho?i/?o   [address] [place] [address]  Ngôn ng?: Ti?ng Shadia  Gi?: Th? timo - Th? sáu 8:00 SA - 4:30 CH  Phí: Mi?n phí   Phone: [phone] Email: [email or web address] Website: [email or web address]://www.[facility]/hra/[place]-housing-and-[email or web address]-raaaixlav-kvm     8  C?ng ??ng [employer] - Qu?n tr? - H? tr? tìm ki?m nhà ? tr?c crystal?n Oumar?ng cách: 7.56 d?m      ?i?n tho?i/?o   [address] [place] [address]  Ngôn ng?: Ti?ng Shadia  Gi?: Th? timo - ch? nh?t M? c?a 24 gi?  Phí: Mi?n phí   Phone: [phone] Email: [email or web address] Website: [email or web address]/     N?i trú ?n cho cá nhân  9  Nhà ? c?a ??ng C?u R?i Oumar?ng cách: 8.76 d?m      M?t ??i m?t   [address] [place] [address]  Ngôn ng?: Ti?ng Shadia  Gi?: Th? timo - ch? nh?t M? c?a 24 gi?  Phí: Mi?n phí   Phone: [phone] Email: [email or web address] Website: [email or web address].org/oursaviourshousing/     10  [facility] [facility] S[facility] C?ng Oumar?ng cách: 10.08 d?m      M?t ??i m?t   [address] [place] [address]  Ngôn ng?: Ti?ng Shadia  Gi?: Th? timo - Th? sáu 4:00 CH - 9:00 SA  Phí: Mi?n phí   Phone: [phone] Email: [email or web address].[email or web address] Website: [email or web address]/[facility]/          Nh?ng  con s? và ty web yung tr?ng       Các d?ch v? kh?n c?p   911  D?ch v? thành ph?   311  Ki?m soát ??c   [phone]  ???ng dây phòng ch?ng t? t?   [phone] (TALK)  ???ng dây nóng l?m d?ng tr? em   [phone] (4-A-Child)  ???ng dây nóng t?n công tình d?c   [phone] (HOPE)  ???ng dây ch?y tr?n qu?c jignesh   [phone] (RUNAWAY)  T?t c? các tùy ch?n [employer]   [phone]  Gi?i thi?u l?m d?ng d??c ch?t   [phone] (HELP)     Statement Selected

## 2023-11-20 NOTE — DISCHARGE NOTE OB - LAUNCH MEDICATION RECONCILIATION
Last ov 7/26/2023  Upcoming ov none    LIPID 04/01/2022  LFT 03/09/2023
<<-----Click here for Discharge Medication Review

## 2023-12-08 NOTE — OB RN INTRAOPERATIVE NOTE - NS_DELIVERYASSIST1_OBGYN_ALL_OB_FT
Sp Tijerina was seen and treated in our emergency department on 12/8/2023.  He may return to work on 12/11/2023.       If you have any questions or concerns, please don't hesitate to call.      Gloria Finch MD Ambika Cueva NP

## 2023-12-22 ENCOUNTER — APPOINTMENT (OUTPATIENT)
Dept: CT IMAGING | Facility: IMAGING CENTER | Age: 28
End: 2023-12-22
Payer: COMMERCIAL

## 2023-12-22 ENCOUNTER — OUTPATIENT (OUTPATIENT)
Dept: OUTPATIENT SERVICES | Facility: HOSPITAL | Age: 28
LOS: 1 days | End: 2023-12-22
Payer: COMMERCIAL

## 2023-12-22 DIAGNOSIS — Z98.890 OTHER SPECIFIED POSTPROCEDURAL STATES: Chronic | ICD-10-CM

## 2023-12-22 DIAGNOSIS — R31.29 OTHER MICROSCOPIC HEMATURIA: ICD-10-CM

## 2023-12-22 DIAGNOSIS — Z98.891 HISTORY OF UTERINE SCAR FROM PREVIOUS SURGERY: Chronic | ICD-10-CM

## 2023-12-22 PROCEDURE — 74178 CT ABD&PLV WO CNTR FLWD CNTR: CPT | Mod: 26

## 2023-12-22 PROCEDURE — 74178 CT ABD&PLV WO CNTR FLWD CNTR: CPT

## 2023-12-28 NOTE — PRE-ANESTHESIA EVALUATION ADULT - BMI (KG/M2)
23.3
What Type Of Note Output Would You Prefer (Optional)?: Standard Output
Hpi Title: Evaluation of Skin Lesions
How Severe Are Your Spot(S)?: mild
Have Your Spot(S) Been Treated In The Past?: has not been treated
Family Member: Sister

## 2024-10-07 ENCOUNTER — APPOINTMENT (OUTPATIENT)
Dept: ORTHOPEDIC SURGERY | Facility: CLINIC | Age: 29
End: 2024-10-07
Payer: COMMERCIAL

## 2024-10-07 VITALS
WEIGHT: 130 LBS | HEIGHT: 64 IN | RESPIRATION RATE: 16 BRPM | SYSTOLIC BLOOD PRESSURE: 100 MMHG | DIASTOLIC BLOOD PRESSURE: 69 MMHG | BODY MASS INDEX: 22.2 KG/M2 | OXYGEN SATURATION: 98 % | HEART RATE: 74 BPM

## 2024-10-07 DIAGNOSIS — M25.561 PAIN IN RIGHT KNEE: ICD-10-CM

## 2024-10-07 DIAGNOSIS — M25.562 PAIN IN RIGHT KNEE: ICD-10-CM

## 2024-10-07 DIAGNOSIS — S83.006A UNSPECIFIED DISLOCATION OF UNSPECIFIED PATELLA, INITIAL ENCOUNTER: ICD-10-CM

## 2024-10-07 DIAGNOSIS — M70.52 OTHER BURSITIS OF KNEE, LEFT KNEE: ICD-10-CM

## 2024-10-07 PROCEDURE — 73564 X-RAY EXAM KNEE 4 OR MORE: CPT | Mod: 50

## 2024-10-07 PROCEDURE — 99202 OFFICE O/P NEW SF 15 MIN: CPT

## 2024-11-01 ENCOUNTER — OUTPATIENT (OUTPATIENT)
Dept: OUTPATIENT SERVICES | Facility: HOSPITAL | Age: 29
LOS: 1 days | End: 2024-11-01
Payer: COMMERCIAL

## 2024-11-01 ENCOUNTER — APPOINTMENT (OUTPATIENT)
Dept: MRI IMAGING | Facility: IMAGING CENTER | Age: 29
End: 2024-11-01

## 2024-11-01 DIAGNOSIS — Z00.8 ENCOUNTER FOR OTHER GENERAL EXAMINATION: ICD-10-CM

## 2024-11-01 DIAGNOSIS — Z98.891 HISTORY OF UTERINE SCAR FROM PREVIOUS SURGERY: Chronic | ICD-10-CM

## 2024-11-01 DIAGNOSIS — Z98.890 OTHER SPECIFIED POSTPROCEDURAL STATES: Chronic | ICD-10-CM

## 2024-11-01 PROCEDURE — 73721 MRI JNT OF LWR EXTRE W/O DYE: CPT

## 2024-11-01 PROCEDURE — 73721 MRI JNT OF LWR EXTRE W/O DYE: CPT | Mod: 26,RT

## 2024-11-15 ENCOUNTER — APPOINTMENT (OUTPATIENT)
Dept: ORTHOPEDIC SURGERY | Facility: CLINIC | Age: 29
End: 2024-11-15

## 2025-08-12 ENCOUNTER — TRANSCRIPTION ENCOUNTER (OUTPATIENT)
Age: 30
End: 2025-08-12

## 2025-09-13 ENCOUNTER — NON-APPOINTMENT (OUTPATIENT)
Age: 30
End: 2025-09-13

## 2025-09-15 ENCOUNTER — APPOINTMENT (OUTPATIENT)
Dept: UROLOGY | Facility: CLINIC | Age: 30
End: 2025-09-15

## 2025-09-15 ENCOUNTER — APPOINTMENT (OUTPATIENT)
Dept: ORTHOPEDIC SURGERY | Facility: CLINIC | Age: 30
End: 2025-09-15
Payer: COMMERCIAL

## 2025-09-15 VITALS — HEIGHT: 64 IN | WEIGHT: 140 LBS | BODY MASS INDEX: 23.9 KG/M2

## 2025-09-15 DIAGNOSIS — S83.281A OTHER TEAR OF LATERAL MENISCUS, CURRENT INJURY, RIGHT KNEE, INITIAL ENCOUNTER: ICD-10-CM

## 2025-09-15 DIAGNOSIS — M22.11 RECURRENT SUBLUXATION OF PATELLA, RIGHT KNEE: ICD-10-CM

## 2025-09-15 PROCEDURE — 99204 OFFICE O/P NEW MOD 45 MIN: CPT
